# Patient Record
Sex: FEMALE | URBAN - METROPOLITAN AREA
[De-identification: names, ages, dates, MRNs, and addresses within clinical notes are randomized per-mention and may not be internally consistent; named-entity substitution may affect disease eponyms.]

---
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
## 2019-05-28 ENCOUNTER — OFFICE VISIT (OUTPATIENT)
Dept: URGENT CARE | Facility: CLINIC | Age: 19
End: 2019-05-28
Payer: COMMERCIAL

## 2019-05-28 VITALS
DIASTOLIC BLOOD PRESSURE: 81 MMHG | HEART RATE: 85 BPM | BODY MASS INDEX: 29.71 KG/M2 | WEIGHT: 174 LBS | HEIGHT: 64 IN | OXYGEN SATURATION: 97 % | TEMPERATURE: 100.8 F | RESPIRATION RATE: 20 BRPM | SYSTOLIC BLOOD PRESSURE: 113 MMHG

## 2019-05-28 DIAGNOSIS — J03.90 TONSILLITIS: Primary | ICD-10-CM

## 2019-05-28 LAB — S PYO AG THROAT QL: NEGATIVE

## 2019-05-28 PROCEDURE — 87147 CULTURE TYPE IMMUNOLOGIC: CPT | Performed by: PHYSICIAN ASSISTANT

## 2019-05-28 PROCEDURE — 87880 STREP A ASSAY W/OPTIC: CPT | Performed by: PHYSICIAN ASSISTANT

## 2019-05-28 PROCEDURE — 87070 CULTURE OTHR SPECIMN AEROBIC: CPT | Performed by: PHYSICIAN ASSISTANT

## 2019-05-28 PROCEDURE — G0382 LEV 3 HOSP TYPE B ED VISIT: HCPCS | Performed by: FAMILY MEDICINE

## 2019-05-28 RX ORDER — AMOXICILLIN 500 MG/1
500 TABLET, FILM COATED ORAL 3 TIMES DAILY
Qty: 30 TABLET | Refills: 0 | Status: SHIPPED | OUTPATIENT
Start: 2019-05-28 | End: 2019-05-28 | Stop reason: CLARIF

## 2019-05-28 RX ORDER — AMOXICILLIN 500 MG/1
500 TABLET, FILM COATED ORAL 3 TIMES DAILY
Qty: 30 TABLET | Refills: 0 | Status: SHIPPED | OUTPATIENT
Start: 2019-05-28 | End: 2019-06-07

## 2019-05-31 ENCOUNTER — TELEPHONE (OUTPATIENT)
Dept: URGENT CARE | Facility: CLINIC | Age: 19
End: 2019-05-31

## 2019-05-31 LAB — BACTERIA THROAT CULT: ABNORMAL

## 2019-09-10 ENCOUNTER — OFFICE VISIT (OUTPATIENT)
Dept: FAMILY MEDICINE CLINIC | Facility: CLINIC | Age: 19
End: 2019-09-10
Payer: COMMERCIAL

## 2019-09-10 VITALS
TEMPERATURE: 98.3 F | RESPIRATION RATE: 19 BRPM | DIASTOLIC BLOOD PRESSURE: 76 MMHG | HEART RATE: 88 BPM | OXYGEN SATURATION: 98 % | BODY MASS INDEX: 29.84 KG/M2 | HEIGHT: 65 IN | WEIGHT: 179.13 LBS | SYSTOLIC BLOOD PRESSURE: 120 MMHG

## 2019-09-10 DIAGNOSIS — Z13.1 SCREENING FOR DIABETES MELLITUS: ICD-10-CM

## 2019-09-10 DIAGNOSIS — N94.6 DYSMENORRHEA: ICD-10-CM

## 2019-09-10 DIAGNOSIS — Z11.3 SCREENING FOR STD (SEXUALLY TRANSMITTED DISEASE): ICD-10-CM

## 2019-09-10 DIAGNOSIS — Z13.29 SCREENING FOR THYROID DISORDER: ICD-10-CM

## 2019-09-10 DIAGNOSIS — N64.4 BREAST TENDERNESS: Primary | ICD-10-CM

## 2019-09-10 DIAGNOSIS — Z76.89 ENCOUNTER TO ESTABLISH CARE: ICD-10-CM

## 2019-09-10 DIAGNOSIS — Z13.0 SCREENING FOR DEFICIENCY ANEMIA: ICD-10-CM

## 2019-09-10 PROBLEM — I88.0 NONSPECIFIC MESENTERIC ADENITIS: Status: ACTIVE | Noted: 2019-09-10

## 2019-09-10 LAB — SL AMB POCT URINE HCG: NEGATIVE

## 2019-09-10 PROCEDURE — 87491 CHLMYD TRACH DNA AMP PROBE: CPT | Performed by: NURSE PRACTITIONER

## 2019-09-10 PROCEDURE — 81025 URINE PREGNANCY TEST: CPT | Performed by: NURSE PRACTITIONER

## 2019-09-10 PROCEDURE — 3008F BODY MASS INDEX DOCD: CPT | Performed by: NURSE PRACTITIONER

## 2019-09-10 PROCEDURE — 87591 N.GONORRHOEAE DNA AMP PROB: CPT | Performed by: NURSE PRACTITIONER

## 2019-09-10 PROCEDURE — 99203 OFFICE O/P NEW LOW 30 MIN: CPT | Performed by: NURSE PRACTITIONER

## 2019-09-10 RX ORDER — ACETAMINOPHEN AND CODEINE PHOSPHATE 120; 12 MG/5ML; MG/5ML
1 SOLUTION ORAL DAILY
Qty: 28 TABLET | Refills: 2 | Status: SHIPPED | OUTPATIENT
Start: 2019-09-10 | End: 2019-12-07 | Stop reason: SDUPTHER

## 2019-09-10 NOTE — PROGRESS NOTES
[facility]    ASSESSMENT AND PLAN     1  Breast tenderness  Presents today with complaint of generalized breast tenderness  Physical assessment as below  Discussed likely hormonal   Discussed contraception/hormonal regulation  Note history of migraines with aura  Rx today for progesterone only Micronor  In office HCG negative  Dose and use reviewed  Encouraged continued condom use  Educated on self breast exams  Due for woman's health/Pap January 2020  Return 6-8 weeks for follow-up  Sooner if needed     - norethindrone (MICRONOR) 0 35 MG tablet; Take 1 tablet (0 35 mg total) by mouth daily  Dispense: 28 tablet; Refill: 2    2  Dysmenorrhea  Menstrual cycle irregular since stopping Depo shot 8 months ago  Cycles are averaging every 2-6 weeks  Cycle completely skipped in June  HCG negative in office today  Micronor started for regulation  If remains symptomatic, will further assess w/ transvaginal ultrasound  Differential considered:  PCOS  - POCT urine HCG    3  Screening for deficiency anemia  Assess for anemia    - CBC and differential; Future    4  Screening for diabetes mellitus    - Comprehensive metabolic panel; Future    5  Screening for thyroid disorder    - TSH, 3rd generation with Free T4 reflex; Future    6  Screening for STD (sexually transmitted disease)  Admits to episodes of unprotected sex  Recommend STD testing  Patient agreeable to urine chlamydia/gonorrhea  Declines further panel at this time  Stressed importance of condom use despite oral contraception    - Chlamydia/GC amplified DNA by PCR    7  Encounter to establish care  Establishing primary care in this office      SUBJECTIVE       Patient ID: Suzy Marquez is a [address] y o  female  Chief Complaint   Patient presents with    New Patient     has been having B/L breast pain, mostly on R x 2 weeks   Denies lump or injury       HISTORY OF PRESENT ILLNESS    Patient presents today to establish primary care in this office  She has complaint of bilateral breast pain  States it started roughly 2-3 months ago  It is her entire breast   The pain/sensitivity comes and goes  She feels it most in the morning when waking  Denies feeling any lumps  Denies any nipple pain or discharge  Her menstrual cycles have been irregular since stopping the Depo shot in December/2019  They average every 4-8 weeks  She did not have a cycle at all the month of June  She had her most recent periods were August 14th, lasting roughly 7 days  She got a 2nd  On August 30 that lasted a few days as well  States her periods will usually last roughly 6 days  They are heavy flow in the 1st few days  She does experience bad cramping  The following portions of the patient's history were reviewed and updated as appropriate: allergies, current medications, past family history, past medical history, past social history, past surgical history and problem list     REVIEW OF SYSTEMS  Review of Systems   Constitutional: Negative  Genitourinary: Positive for menstrual problem and vaginal bleeding  Negative for difficulty urinating, dyspareunia, genital sores and pelvic pain  Breast tenderness, bilaterally       OBJECTIVE      VITAL SIGNS  /76 (BP Location: Left arm, Patient Position: Sitting, Cuff Size: Adult)   Pulse 88   Temp 98 3 °F (36 8 °C) (Tympanic)   Resp 19   Ht 5' 4 57" (1 64 m)   Wt 81 3 kg (179 lb 2 oz)   LMP 08/30/2019   SpO2 98%   Breastfeeding? No   BMI 30 21 kg/m²     CURRENT MEDICATIONS    Current Outpatient Medications:     norethindrone (MICRONOR) 0 35 MG tablet, Take 1 tablet (0 35 mg total) by mouth daily, Disp: 28 tablet, Rfl: 2      PHYSICAL EXAMINATION   Physical Exam   Constitutional: She is oriented to person, place, and time  Vital signs are normal  She appears well-developed and well-nourished  Cardiovascular: Normal rate and regular rhythm     Pulmonary/Chest: Effort normal and breath sounds normal  No respiratory distress  Right breast exhibits no inverted nipple, no mass, no nipple discharge and no tenderness  Left breast exhibits no inverted nipple, no mass, no nipple discharge and no tenderness  No breast swelling  Breasts are symmetrical    Bilateral nipple piercings   Lymphadenopathy:     She has no axillary adenopathy  Neurological: She is alert and oriented to person, place, and time  Skin: Skin is warm, dry and intact  Psychiatric: She has a normal mood and affect  Judgment and thought content normal    Nursing note and vitals reviewed

## 2019-09-11 LAB
C TRACH DNA SPEC QL NAA+PROBE: NEGATIVE
N GONORRHOEA DNA SPEC QL NAA+PROBE: NEGATIVE

## 2019-12-07 DIAGNOSIS — Z11.3 SCREENING FOR STD (SEXUALLY TRANSMITTED DISEASE): ICD-10-CM

## 2019-12-08 RX ORDER — NORETHINDRONE 0.35 MG/1
TABLET ORAL
Qty: 28 TABLET | Refills: 2 | Status: SHIPPED | OUTPATIENT
Start: 2019-12-08 | End: 2020-02-26 | Stop reason: SDUPTHER

## 2020-02-21 DIAGNOSIS — Z11.3 SCREENING FOR STD (SEXUALLY TRANSMITTED DISEASE): ICD-10-CM

## 2020-02-21 RX ORDER — ACETAMINOPHEN AND CODEINE PHOSPHATE 120; 12 MG/5ML; MG/5ML
1 SOLUTION ORAL DAILY
Qty: 28 TABLET | Refills: 2 | OUTPATIENT
Start: 2020-02-21

## 2020-02-21 NOTE — TELEPHONE ENCOUNTER
Please call patient  She is due for her gyn/Pap exam   Please assist her in scheduling same  Her birth control can be refilled at that time    She should have at least 1 more month left

## 2020-02-25 NOTE — TELEPHONE ENCOUNTER
Pt called and stated she went to pharmacy and they stated she no longer has any refills  Pt scheduling pap / gyn exam now

## 2020-02-26 DIAGNOSIS — Z11.3 SCREENING FOR STD (SEXUALLY TRANSMITTED DISEASE): ICD-10-CM

## 2020-02-26 DIAGNOSIS — N94.6 DYSMENORRHEA: Primary | ICD-10-CM

## 2020-02-26 RX ORDER — ACETAMINOPHEN AND CODEINE PHOSPHATE 120; 12 MG/5ML; MG/5ML
1 SOLUTION ORAL DAILY
Qty: 28 TABLET | Refills: 0 | Status: SHIPPED | OUTPATIENT
Start: 2020-02-26 | End: 2020-03-25 | Stop reason: SDUPTHER

## 2020-02-29 DIAGNOSIS — N94.6 DYSMENORRHEA: ICD-10-CM

## 2020-03-01 RX ORDER — NORETHINDRONE 0.35 MG/1
TABLET ORAL
Qty: 28 TABLET | Refills: 2 | OUTPATIENT
Start: 2020-03-01

## 2020-03-06 ENCOUNTER — ANNUAL EXAM (OUTPATIENT)
Dept: FAMILY MEDICINE CLINIC | Facility: CLINIC | Age: 20
End: 2020-03-06
Payer: COMMERCIAL

## 2020-03-06 VITALS
BODY MASS INDEX: 32.61 KG/M2 | WEIGHT: 191 LBS | OXYGEN SATURATION: 99 % | HEIGHT: 64 IN | HEART RATE: 67 BPM | DIASTOLIC BLOOD PRESSURE: 70 MMHG | SYSTOLIC BLOOD PRESSURE: 122 MMHG | TEMPERATURE: 97.4 F

## 2020-03-06 DIAGNOSIS — Z13.220 SCREENING FOR LIPID DISORDERS: ICD-10-CM

## 2020-03-06 DIAGNOSIS — Z13.29 SCREENING FOR THYROID DISORDER: ICD-10-CM

## 2020-03-06 DIAGNOSIS — N63.0 BREAST LUMP: ICD-10-CM

## 2020-03-06 DIAGNOSIS — Z12.4 CERVICAL CANCER SCREENING: ICD-10-CM

## 2020-03-06 DIAGNOSIS — Z13.1 SCREENING FOR DIABETES MELLITUS: ICD-10-CM

## 2020-03-06 DIAGNOSIS — Z01.419 ENCOUNTER FOR GYNECOLOGICAL EXAMINATION: Primary | ICD-10-CM

## 2020-03-06 PROCEDURE — 1036F TOBACCO NON-USER: CPT | Performed by: NURSE PRACTITIONER

## 2020-03-06 PROCEDURE — G0143 SCR C/V CYTO,THINLAYER,RESCR: HCPCS | Performed by: NURSE PRACTITIONER

## 2020-03-06 PROCEDURE — 87624 HPV HI-RISK TYP POOLED RSLT: CPT | Performed by: NURSE PRACTITIONER

## 2020-03-06 PROCEDURE — 99214 OFFICE O/P EST MOD 30 MIN: CPT | Performed by: NURSE PRACTITIONER

## 2020-03-10 LAB
HPV HR 12 DNA CVX QL NAA+PROBE: NEGATIVE
HPV16 DNA CVX QL NAA+PROBE: NEGATIVE
HPV18 DNA CVX QL NAA+PROBE: NEGATIVE

## 2020-03-11 LAB
LAB AP GYN PRIMARY INTERPRETATION: NORMAL
Lab: NORMAL

## 2020-03-16 NOTE — PROGRESS NOTES
[facility]    ASSESSMENT AND PLAN     1  Encounter for gynecological examination  Presents today for first women's health exam   Nulliparous  Sexually active  In heterosexual/monogamous relationship  Denies need for STD testing  Birth control:  Micronor and condoms  No side effects from [facility]  Periods regular at every 28 days  Denies excessive cramping and or clotting  Speculum exam today as below  Specimen sent for cervical cancer screen  Reviewed proper technique for self-breast exams  Breast exam today with tenderness bilateral breasts and small, mobile mass noted right breast   Patient states it has been there several months  Will assess with ultrasound  Call with results and further plan if indicated  - Liquid-based pap, screening  - HPV High Risk; Future  - HPV High Risk    2  Cervical cancer screening    - Liquid-based pap, screening  - HPV High Risk; Future  - HPV High Risk    3  Screening for thyroid disorder  Will assess thyroid function    - TSH, 3rd generation; Future    4  Screening for lipid disorders  Baseline lipid panel    - Lipid panel; Future    5  Screening for diabetes mellitus    - Comprehensive metabolic panel; Future    6  Breast lump    - US breast right limited (diagnostic); Future            SUBJECTIVE       Patient ID: Gladis Sanches is a 23 y o  female  Chief Complaint   Patient presents with    Gynecologic Exam     Pap smear       HISTORY OF PRESENT ILLNESS    Patient presents today for 1st women's health exam  Sexually active  In heterosexual/monogamous relationship  Denies need for STD testing  Birth control:  Micronor and condoms  No side effects from [facility]  Periods regular at every 28 days  Denies excessive cramping and or clotting          The following portions of the patient's history were reviewed and updated as appropriate: allergies, current medications, past family history, past medical history, past social history, past surgical history and problem list     REVIEW OF SYSTEMS  Review of Systems   Constitutional: Negative  Respiratory: Negative  Cardiovascular: Negative  Gastrointestinal: Negative  Endocrine: Negative  Genitourinary: Negative  Skin: Negative  Neurological: Negative  Psychiatric/Behavioral: Negative  OBJECTIVE      VITAL SIGNS  /70 (BP Location: Left arm, Patient Position: Sitting, Cuff Size: Adult)   Pulse 67   Temp (!) 97 4 °F (36 3 °C)   Ht 5' 4 17" (1 63 m)   Wt 86 6 kg (191 lb)   SpO2 99%   BMI 32 61 kg/m²     CURRENT MEDICATIONS    Current Outpatient Medications:     norethindrone (Yasmeen) 0 35 MG tablet, Take 1 tablet (0 35 mg total) by mouth daily, Disp: 28 tablet, Rfl: 0      PHYSICAL EXAMINATION   Physical Exam   Constitutional: She is oriented to person, place, and time  Vital signs are normal  She appears well-developed and well-nourished  Neck: No thyromegaly present  Cardiovascular: Normal rate, regular rhythm and normal heart sounds  Pulmonary/Chest: Effort normal and breath sounds normal  No respiratory distress  Right breast exhibits mass and tenderness  Right breast exhibits no inverted nipple and no skin change  Left breast exhibits no inverted nipple, no nipple discharge, no skin change and no tenderness  Bilateral nipples with piercing holes   Abdominal: Soft  Normal appearance and bowel sounds are normal  There is no tenderness  Genitourinary: Vagina normal  Rectal exam shows no external hemorrhoid, no fissure and no tenderness  Pelvic exam was performed with patient supine  No labial fusion  There is no rash, tenderness, lesion or injury on the right labia  There is no rash, tenderness, lesion or injury on the left labia  Cervix exhibits no motion tenderness and no discharge  Right adnexum displays no mass, no tenderness and no fullness  Left adnexum displays no mass, no tenderness and no fullness     Neurological: She is alert and oriented to person, place, and time    Psychiatric: She has a normal mood and affect  Thought content normal    Nursing note and vitals reviewed

## 2020-03-25 DIAGNOSIS — N94.6 DYSMENORRHEA: ICD-10-CM

## 2020-03-25 RX ORDER — ACETAMINOPHEN AND CODEINE PHOSPHATE 120; 12 MG/5ML; MG/5ML
1 SOLUTION ORAL DAILY
Qty: 28 TABLET | Refills: 5 | Status: SHIPPED | OUTPATIENT
Start: 2020-03-25 | End: 2020-04-22 | Stop reason: SDUPTHER

## 2020-03-25 NOTE — TELEPHONE ENCOUNTER
Pt called and needs refill on:    - norethindrone (yumi) 0 35 mg tablet  Takes 1 tablet by mouth daily  Qty: 28 tablet    Send to [facility] in [place]

## 2020-04-22 DIAGNOSIS — N94.6 DYSMENORRHEA: ICD-10-CM

## 2020-04-22 RX ORDER — ACETAMINOPHEN AND CODEINE PHOSPHATE 120; 12 MG/5ML; MG/5ML
1 SOLUTION ORAL DAILY
Qty: 28 TABLET | Refills: 5 | Status: SHIPPED | OUTPATIENT
Start: 2020-04-22 | End: 2020-10-05

## 2020-09-22 ENCOUNTER — OFFICE VISIT (OUTPATIENT)
Dept: FAMILY MEDICINE CLINIC | Facility: CLINIC | Age: 20
End: 2020-09-22
Payer: COMMERCIAL

## 2020-09-22 VITALS
WEIGHT: 202.6 LBS | BODY MASS INDEX: 33.76 KG/M2 | OXYGEN SATURATION: 98 % | HEIGHT: 65 IN | SYSTOLIC BLOOD PRESSURE: 114 MMHG | TEMPERATURE: 98 F | DIASTOLIC BLOOD PRESSURE: 66 MMHG | HEART RATE: 67 BPM

## 2020-09-22 DIAGNOSIS — N94.6 DYSMENORRHEA: Primary | ICD-10-CM

## 2020-09-22 DIAGNOSIS — N63.0 BREAST LUMP: ICD-10-CM

## 2020-09-22 DIAGNOSIS — L70.9 ACNE, UNSPECIFIED ACNE TYPE: ICD-10-CM

## 2020-09-22 PROCEDURE — 99214 OFFICE O/P EST MOD 30 MIN: CPT | Performed by: NURSE PRACTITIONER

## 2020-09-27 NOTE — PROGRESS NOTES
[facility]    ASSESSMENT AND PLAN     1  Dysmenorrhea  Reviewed symptoms at length today  Discussed that breakthrough bleeding can occur when on progesterone birth control  Note last Pap normal in March  Denies risk for STDs  No vaginal itch, lesion, discharge  Monogamous relationship  Denies possible pregnancy  Not on estrogen candidate secondary to migraines with aura  Reviewed IUD  Patient would prefer to stay on Micronor at this time  Will monitor symptoms and follow-up if she would like to change/altered treatment    2  Acne, unspecified acne type  Patient with intermittent acne breakouts on face  Requesting referral to Dermatology  Given today    - Ambulatory referral to Dermatology; Future    3  Breast lump  Small, mobile mass still noted roughly 10 o'clock right breast   Referred for ultrasound in March  Patient has yet to obtain  Patient noted it several months prior to that  Likely cyst, but do recommend follow-up with the ultrasound  Patient states she will schedule      Note labs were recommended at annual woman's exam in March for baseline  Patient has yet to obtain      SUBJECTIVE       Patient ID: Kevin Padron is a 23 y o  female  Chief Complaint   Patient presents with    Menstrual Problem     Patient states her periods have been irregular and occuring several times during the month x several months  Lasts usually 5 days       HISTORY OF PRESENT ILLNESS    Patient presents today to discuss irregular menses  Noted for the last few months  States she has breakthrough bleeding during the month  Does not appear to be a full peroid  Blood is dark brown throughout the month  Denies any vaginal pain or discharge  No itching or lesions  Monogamous sexual relationship  Denies STDs  Would also like to discuss acne  Notes it worsening over the last few months    Believes it may be secondary to mask        The following portions of the patient's history were reviewed and updated as appropriate: allergies, current medications, past family history, past medical history, past social history, past surgical history and problem list     REVIEW OF SYSTEMS  Review of Systems   Genitourinary: Positive for menstrual problem  Negative for difficulty urinating, dyspareunia, dysuria, genital sores, hematuria, pelvic pain, urgency, vaginal discharge and vaginal pain  Skin:        Facial acne  OBJECTIVE      VITAL SIGNS  /66 (BP Location: Left arm, Patient Position: Sitting, Cuff Size: Adult)   Pulse 67   Temp 98 °F (36 7 °C)   Ht 5' 4 75" (1 645 m)   Wt 91 9 kg (202 lb 9 6 oz)   LMP 09/16/2020 (Exact Date)   SpO2 98%   BMI 33 98 kg/m²     CURRENT MEDICATIONS    Current Outpatient Medications:     norethindrone (Yasmeen) 0 35 MG tablet, Take 1 tablet (0 35 mg total) by mouth daily, Disp: 28 tablet, Rfl: 5      PHYSICAL EXAMINATION   Physical Exam  Vitals signs and nursing note reviewed  Constitutional:       General: She is not in acute distress  Appearance: Normal appearance  She is well-developed  She is not ill-appearing  Pulmonary:      Effort: Pulmonary effort is normal  No respiratory distress  Chest:          Comments: Bilateral nipple piercing  Skin:     General: Skin is warm and dry  Comments: Facial pustular acne   Neurological:      Mental Status: She is alert and oriented to person, place, and time  Psychiatric:         Attention and Perception: Attention and perception normal          Mood and Affect: Mood and affect normal          Speech: Speech normal          Behavior: Behavior normal          Thought Content:  Thought content normal          Judgment: Judgment normal

## 2020-10-05 DIAGNOSIS — N94.6 DYSMENORRHEA: ICD-10-CM

## 2020-10-05 RX ORDER — NORETHINDRONE 0.35 MG/1
TABLET ORAL
Qty: 84 TABLET | Refills: 0 | Status: SHIPPED | OUTPATIENT
Start: 2020-10-05 | End: 2020-12-28 | Stop reason: SDUPTHER

## 2020-10-06 DIAGNOSIS — N94.6 DYSMENORRHEA: ICD-10-CM

## 2020-10-06 RX ORDER — ACETAMINOPHEN AND CODEINE PHOSPHATE 120; 12 MG/5ML; MG/5ML
1 SOLUTION ORAL DAILY
Qty: 84 TABLET | Refills: 0 | OUTPATIENT
Start: 2020-10-06

## 2020-11-23 ENCOUNTER — TELEMEDICINE (OUTPATIENT)
Dept: FAMILY MEDICINE CLINIC | Facility: CLINIC | Age: 20
End: 2020-11-23
Payer: COMMERCIAL

## 2020-11-23 DIAGNOSIS — Z20.822 EXPOSURE TO COVID-19 VIRUS: Primary | ICD-10-CM

## 2020-11-23 PROCEDURE — 99213 OFFICE O/P EST LOW 20 MIN: CPT | Performed by: FAMILY MEDICINE

## 2020-12-28 DIAGNOSIS — N94.6 DYSMENORRHEA: ICD-10-CM

## 2020-12-28 RX ORDER — ACETAMINOPHEN AND CODEINE PHOSPHATE 120; 12 MG/5ML; MG/5ML
1 SOLUTION ORAL DAILY
Qty: 84 TABLET | Refills: 0 | Status: SHIPPED | OUTPATIENT
Start: 2020-12-28 | End: 2021-03-21 | Stop reason: SDUPTHER

## 2021-02-22 ENCOUNTER — OFFICE VISIT (OUTPATIENT)
Dept: FAMILY MEDICINE CLINIC | Facility: CLINIC | Age: 21
End: 2021-02-22
Payer: COMMERCIAL

## 2021-02-22 VITALS
BODY MASS INDEX: 33.52 KG/M2 | OXYGEN SATURATION: 98 % | SYSTOLIC BLOOD PRESSURE: 110 MMHG | DIASTOLIC BLOOD PRESSURE: 70 MMHG | TEMPERATURE: 98.3 F | HEIGHT: 65 IN | HEART RATE: 77 BPM | WEIGHT: 201.2 LBS
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
## 2021-02-22 DIAGNOSIS — R55 SYNCOPE, UNSPECIFIED SYNCOPE TYPE: ICD-10-CM

## 2021-02-22 DIAGNOSIS — S00.83XA CONTUSION OF JAW, INITIAL ENCOUNTER: ICD-10-CM

## 2021-02-22 PROCEDURE — 1036F TOBACCO NON-USER: CPT | Performed by: NURSE PRACTITIONER

## 2021-02-22 PROCEDURE — 3008F BODY MASS INDEX DOCD: CPT | Performed by: NURSE PRACTITIONER

## 2021-02-22 PROCEDURE — 99214 OFFICE O/P EST MOD 30 MIN: CPT | Performed by: NURSE PRACTITIONER

## 2021-02-22 RX ORDER — IBUPROFEN 600 MG/1
600 TABLET ORAL EVERY 6 HOURS PRN
Qty: 30 TABLET | Refills: 1 | Status: SHIPPED | OUTPATIENT
Start: 2021-02-22

## 2021-02-22 RX ORDER — ONDANSETRON 4 MG/1
4 TABLET, ORALLY DISINTEGRATING ORAL EVERY 8 HOURS PRN
COMMUNITY
Start: 2021-02-15 | End: 2022-03-30

## 2021-02-22 NOTE — PROGRESS NOTES
[facility]    ASSESSMENT AND PLAN     1  Fall, initial encounter   reviewed details of recent fall  Assessment today unremarkable with exception slight, residual jaw pain (as below ) and large ecchymotic area right glute  No signs of head injury or concussion  Does not appear treatment or further workup warranted at this time  Patient has been advised to return for further eval with any further problems and/or concerns  ER precautions    2  Syncope, unspecified syncope type    Unclear as to the etiology of her "blackout" episode in the bathroom after fall down stairs  Question pain induced? Assessment unremarkable today as above  In office EKG completed: Sinus erma with sinus arrhythmia  Otherwise negative  Patient to monitor symptoms and return for further eval should she have any further episodes    - POCT ECG    3  Contusion of jaw, initial encounter  Symptoms slowly improving  Recommend intermittent heat, gentle stretches  Avoid hard, chewy foods for next few days  Rx for motrin 600  Return for further workup (possible film) if persist despite conservative treatment  - ibuprofen (MOTRIN) 600 mg tablet; Take 1 tablet (600 mg total) by mouth every 6 (six) hours as needed for mild pain or moderate pain  Dispense: 30 tablet; Refill: 1            SUBJECTIVE       Patient ID: Leticia De La Rosa is a 24 y o  female  Chief Complaint   Patient presents with   [place] Fall     patient states she fell down steps Saturday night, landed on buttocks, she felt sweaty, lightheaded and thought she had to go to the bathroom, then blacked out in bathroom, fell on floor, states she fell on left side of jaw  HISTORY OF PRESENT ILLNESS     Patient presents today s/p fall on Saturday night  States she was walking down set of inside  steps and her foot slipped  Slid down a few steps landing on her right buttock  Did not hit her head  No loss of consciousness   Noted fairly significant pain in right buttock, so she went to lie down  Pain made her little weak and nauseous  Got up shortly after to go to the bathroom, and felt sweaty and lightheaded when walking to the bathroom  ASsumeed secondary to pain in buttock  States she went to sit on the toilet, and believe she may  "Blacked out"  Woke up on the floor in the bathroom  Unsure if she hit her head at that time, but noted some pain on her left jaw  Went to bed and slept well that night  Sunday with mild , generalized headache  Jaw stiff and painful to open  No visual disturbances  No numbness or weakness  Right buttock with large bruising  She had concern for concussion  Symptoms improved today        The following portions of the patient's history were reviewed and updated as appropriate: allergies, current medications, past family history, past medical history, past social history, past surgical history and problem list     REVIEW OF SYSTEMS  Review of Systems   Constitutional: Negative  HENT: Negative  Left jaw pain - improving   Eyes: Negative for photophobia and visual disturbance  Respiratory: Negative  Cardiovascular: Negative  Gastrointestinal: Negative  Genitourinary: Negative  Musculoskeletal: Negative  Skin:        Right buttock bruising   Neurological: Negative for dizziness, weakness, numbness and headaches  Psychiatric/Behavioral: Negative          OBJECTIVE      VITAL SIGNS  /70 (BP Location: Left arm, Patient Position: Sitting, Cuff Size: Adult)   Pulse 77   Temp 98 3 °F (36 8 °C)   Ht 5' 5" (1 651 m)   Wt 91 3 kg (201 lb 3 2 oz)   LMP 02/10/2021 (Exact Date)   SpO2 98%   BMI 33 48 kg/m²     CURRENT MEDICATIONS    Current Outpatient Medications:     norethindrone (Yasmeen) 0 35 MG tablet, Take 1 tablet (0 35 mg total) by mouth daily, Disp: 84 tablet, Rfl: 0    ibuprofen (MOTRIN) 600 mg tablet, Take 1 tablet (600 mg total) by mouth every 6 (six) hours as needed for mild pain or moderate pain, Disp: 30 tablet, Rfl: 1    ondansetron (ZOFRAN-ODT) 4 mg disintegrating tablet, Take 4 mg by mouth every 8 (eight) hours as needed, Disp: , Rfl:       PHYSICAL EXAMINATION   Physical Exam  Vitals signs and nursing note reviewed  Constitutional:       General: She is not in acute distress  Appearance: Normal appearance  She is well-developed and well-groomed  She is not ill-appearing  HENT:      Head: Normocephalic and atraumatic  Right Ear: Tympanic membrane, ear canal and external ear normal       Left Ear: Tympanic membrane, ear canal and external ear normal       Nose: Nose normal       Mouth/Throat:      Mouth: Mucous membranes are moist       Pharynx: Oropharynx is clear  Comments: Mild stated pain upon jaw opening, but ROM intact  No clicking or popping  Mild  pain with palpation  Eyes:      Extraocular Movements: Extraocular movements intact  Conjunctiva/sclera: Conjunctivae normal       Pupils: Pupils are equal, round, and reactive to light  Neck:      Musculoskeletal: Full passive range of motion without pain  Cardiovascular:      Rate and Rhythm: Normal rate and regular rhythm  Heart sounds: Normal heart sounds  Pulmonary:      Effort: Pulmonary effort is normal  No respiratory distress  Breath sounds: Normal breath sounds and air entry  Lymphadenopathy:      Cervical: No cervical adenopathy  Skin:     General: Skin is warm and dry  Neurological:      Mental Status: She is alert and oriented to person, place, and time  Cranial Nerves: Cranial nerves are intact  Sensory: Sensation is intact  Motor: Motor function is intact  Coordination: Coordination is intact  Gait: Gait is intact  Deep Tendon Reflexes:      Reflex Scores:       Bicep reflexes are 2+ on the right side and 2+ on the left side  Patellar reflexes are 2+ on the right side and 2+ on the left side    Psychiatric:         Attention and Perception: Attention normal  Mood and Affect: Mood normal          Speech: Speech normal          Behavior: Behavior normal          Thought Content:  Thought content normal          Cognition and Memory: Cognition normal          Judgment: Judgment normal

## 2021-02-23 PROCEDURE — 93000 ELECTROCARDIOGRAM COMPLETE: CPT | Performed by: NURSE PRACTITIONER

## 2021-03-21 DIAGNOSIS — N94.6 DYSMENORRHEA: ICD-10-CM

## 2021-03-23 RX ORDER — ACETAMINOPHEN AND CODEINE PHOSPHATE 120; 12 MG/5ML; MG/5ML
1 SOLUTION ORAL DAILY
Qty: 84 TABLET | Refills: 0 | Status: SHIPPED | OUTPATIENT
Start: 2021-03-23 | End: 2021-06-17

## 2021-06-15 DIAGNOSIS — N94.6 DYSMENORRHEA: ICD-10-CM

## 2021-06-17 DIAGNOSIS — N94.6 DYSMENORRHEA: ICD-10-CM

## 2021-06-17 RX ORDER — ACETAMINOPHEN AND CODEINE PHOSPHATE 120; 12 MG/5ML; MG/5ML
1 SOLUTION ORAL DAILY
Qty: 84 TABLET | Refills: 0 | OUTPATIENT
Start: 2021-06-17

## 2021-06-17 RX ORDER — NORETHINDRONE 0.35 MG/1
TABLET ORAL
Qty: 84 TABLET | Refills: 0 | Status: SHIPPED | OUTPATIENT
Start: 2021-06-17 | End: 2021-09-04 | Stop reason: SDUPTHER

## 2021-09-04 DIAGNOSIS — N94.6 DYSMENORRHEA: ICD-10-CM

## 2021-09-04 RX ORDER — ACETAMINOPHEN AND CODEINE PHOSPHATE 120; 12 MG/5ML; MG/5ML
1 SOLUTION ORAL DAILY
Qty: 84 TABLET | Refills: 1 | Status: SHIPPED | OUTPATIENT
Start: 2021-09-04 | End: 2022-02-15

## 2022-02-15 DIAGNOSIS — N94.6 DYSMENORRHEA: ICD-10-CM

## 2022-02-15 RX ORDER — ACETAMINOPHEN AND CODEINE PHOSPHATE 120; 12 MG/5ML; MG/5ML
SOLUTION ORAL
Qty: 84 TABLET | Refills: 1 | Status: SHIPPED | OUTPATIENT
Start: 2022-02-15 | End: 2022-03-11 | Stop reason: SDUPTHER

## 2022-02-25 ENCOUNTER — OFFICE VISIT (OUTPATIENT)
Dept: FAMILY MEDICINE CLINIC | Facility: CLINIC | Age: 22
End: 2022-02-25
Payer: MEDICARE

## 2022-02-25 VITALS
SYSTOLIC BLOOD PRESSURE: 116 MMHG | TEMPERATURE: 98.8 F | HEART RATE: 65 BPM | HEIGHT: 65 IN | BODY MASS INDEX: 31.96 KG/M2 | DIASTOLIC BLOOD PRESSURE: 70 MMHG | WEIGHT: 191.8 LBS | OXYGEN SATURATION: 93 %

## 2022-02-25 DIAGNOSIS — Z13.29 SCREENING FOR THYROID DISORDER: ICD-10-CM

## 2022-02-25 DIAGNOSIS — Z13.1 SCREENING FOR DIABETES MELLITUS: ICD-10-CM

## 2022-02-25 DIAGNOSIS — Z11.3 SCREENING FOR STD (SEXUALLY TRANSMITTED DISEASE): ICD-10-CM

## 2022-02-25 DIAGNOSIS — Z23 NEED FOR TDAP VACCINATION: ICD-10-CM

## 2022-02-25 DIAGNOSIS — Z11.59 NEED FOR HEPATITIS C SCREENING TEST: ICD-10-CM

## 2022-02-25 DIAGNOSIS — Z11.4 SCREENING FOR HIV (HUMAN IMMUNODEFICIENCY VIRUS): ICD-10-CM

## 2022-02-25 DIAGNOSIS — Z13.0 SCREENING FOR DEFICIENCY ANEMIA: ICD-10-CM

## 2022-02-25 DIAGNOSIS — Z13.0 SCREENING FOR IRON DEFICIENCY ANEMIA: ICD-10-CM

## 2022-02-25 DIAGNOSIS — N94.6 DYSMENORRHEA: Primary | ICD-10-CM

## 2022-02-25 DIAGNOSIS — Z13.21 ENCOUNTER FOR VITAMIN DEFICIENCY SCREENING: ICD-10-CM

## 2022-02-25 DIAGNOSIS — Z13.220 SCREENING FOR LIPID DISORDERS: ICD-10-CM

## 2022-02-25 DIAGNOSIS — N92.3 SPOTTING BETWEEN MENSES: ICD-10-CM

## 2022-02-25 PROCEDURE — 90471 IMMUNIZATION ADMIN: CPT | Performed by: NURSE PRACTITIONER

## 2022-02-25 PROCEDURE — 99214 OFFICE O/P EST MOD 30 MIN: CPT | Performed by: NURSE PRACTITIONER

## 2022-02-25 PROCEDURE — 90715 TDAP VACCINE 7 YRS/> IM: CPT | Performed by: NURSE PRACTITIONER

## 2022-02-25 NOTE — PROGRESS NOTES
[facility]    ASSESSMENT AND PLAN     1  Dysmenorrhea  Menses remains irregular  Frequent spotting and painful cramping some months  Note she has not had a migraine though since starting the progesterone only birth control  We discussed other birth control options, and she is interested in exploring an IUD  She is also due for women's health in March  Will refer today to establish with gyn for same, and to discuss if she is a candidate for the IUD  - Ambulatory Referral to Gynecology; Future    2  Spotting between menses    - Ambulatory Referral to Gynecology; Future    3  Screening for STD (sexually transmitted disease)  Patient not currently high risk, but has not had routine screening quite some time  Will add to lab work as below  - Human Immunodeficiency Virus 1/2 Antigen / Antibody ( Fourth Generation) with Reflex Testing; Future  - Chlamydia/GC amplified DNA by PCR; Future  - RPR; Future  - HSV TYPE 1,2 DNA PCR; Future  - Hepatitis C Ab W/Refl To HCV RNA, Qn, PCR; Future  - Human Immunodeficiency Virus 1/2 Antigen / Antibody ( Fourth Generation) with Reflex Testing  - Chlamydia/GC amplified DNA by PCR  - RPR  - HSV TYPE 1,2 DNA PCR  - Hepatitis C Ab W/Refl To HCV RNA, Qn, PCR    4  Screening for HIV (human immunodeficiency virus)  Age-appropriate recommended screening    - Human Immunodeficiency Virus 1/2 Antigen / Antibody ( Fourth Generation) with Reflex Testing; Future  - Human Immunodeficiency Virus 1/2 Antigen / Antibody ( Fourth Generation) with Reflex Testing    5  Need for hepatitis C screening test  Age-appropriate recommended screenings    - Hepatitis C Ab W/Refl To HCV RNA, Qn, PCR; Future  - Hepatitis C Ab W/Refl To HCV RNA, Qn, PCR    6  Screening for iron deficiency anemia  Due for annual physical   Fasting lab work orders placed as below, to complete prior  Schedule wellness exam in the next 2-3 weeks    Review results at that time    - CBC and differential; Future  - Iron, TIBC and Ferritin Panel; Future  - CBC and differential  - Iron, TIBC and Ferritin Panel    7  Encounter for vitamin deficiency screening  Will screen today, but did advised patient to verify insurance coverage prior    - Vitamin D 25 hydroxy; Future  - Vitamin D 25 hydroxy    8  Screening for deficiency anemia    - CBC and differential; Future  - CBC and differential    9  Screening for thyroid disorder    - TSH, 3rd generation with Free T4 reflex; Future  - TSH, 3rd generation with Free T4 reflex    10  Screening for diabetes mellitus    - Comprehensive metabolic panel; Future  - Comprehensive metabolic panel    11  Screening for lipid disorders    - Lipid panel; Future  - Lipid panel    12  Need for Tdap vaccination  Overdue  Will administered today    - TDAP VACCINE GREATER THAN OR EQUAL TO 6YO IM            SUBJECTIVE       Patient ID: Heriberto Valente is a 20 y o  female  Chief Complaint   Patient presents with    Menstrual Problem     Spotting in between cycles, excessive cramping       HISTORY OF PRESENT ILLNESS    Presents today to discuss irregular menses  She still gets spotting in between cycles  Sometimes last for several days  Notes increase in cramping some months  Sharp pains that come and go  She has not had a migraine since starting the progesterone though  The following portions of the patient's history were reviewed and updated as appropriate: allergies, current medications, past family history, past medical history, past social history, past surgical history and problem list     REVIEW OF SYSTEMS  Review of Systems   Constitutional: Negative  Respiratory: Negative  Cardiovascular: Negative  Gastrointestinal: Negative  Genitourinary: Positive for menstrual problem  Negative for dyspareunia and genital sores  Cyst on breast   Painful/tender breasts   Neurological: Negative  Psychiatric/Behavioral: Negative          OBJECTIVE      VITAL SIGNS  /70 (BP Location: Right arm, Patient Position: Sitting, Cuff Size: Adult)   Pulse 65   Temp 98 8 °F (37 1 °C)   Ht 5' 5" (1 651 m)   Wt 87 kg (191 lb 12 8 oz)   LMP 01/23/2022 (Exact Date)   SpO2 93%   BMI 31 92 kg/m²     CURRENT MEDICATIONS    Current Outpatient Medications:     ibuprofen (MOTRIN) 600 mg tablet, Take 1 tablet (600 mg total) by mouth every 6 (six) hours as needed for mild pain or moderate pain, Disp: 30 tablet, Rfl: 1    norethindrone (MICRONOR) 0 35 MG tablet, TAKE 1 TABLET BY MOUTH EVERY DAY, Disp: 84 tablet, Rfl: 1    ondansetron (ZOFRAN-ODT) 4 mg disintegrating tablet, Take 4 mg by mouth every 8 (eight) hours as needed, Disp: , Rfl:       PHYSICAL EXAMINATION   Physical Exam  Vitals and nursing note reviewed  Constitutional:       General: She is not in acute distress  Appearance: Normal appearance  She is well-developed and well-groomed  She is not ill-appearing  Pulmonary:      Effort: Pulmonary effort is normal  No respiratory distress  Chest:       Skin:     General: Skin is warm and dry  Neurological:      General: No focal deficit present  Mental Status: She is alert and oriented to person, place, and time  Psychiatric:         Attention and Perception: Attention normal          Mood and Affect: Mood normal          Speech: Speech normal          Behavior: Behavior normal          Thought Content:  Thought content normal          Judgment: Judgment normal

## 2022-03-11 ENCOUNTER — OFFICE VISIT (OUTPATIENT)
Dept: OBGYN CLINIC | Facility: CLINIC | Age: 22
End: 2022-03-11
Payer: COMMERCIAL

## 2022-03-11 VITALS — WEIGHT: 191 LBS | BODY MASS INDEX: 31.78 KG/M2 | SYSTOLIC BLOOD PRESSURE: 130 MMHG | DIASTOLIC BLOOD PRESSURE: 80 MMHG

## 2022-03-11 DIAGNOSIS — N94.6 DYSMENORRHEA: ICD-10-CM

## 2022-03-11 DIAGNOSIS — N94.6 MENSTRUAL CRAMPS: ICD-10-CM

## 2022-03-11 DIAGNOSIS — N92.3 SPOTTING BETWEEN MENSES: ICD-10-CM

## 2022-03-11 DIAGNOSIS — Z01.419 ENCOUNTER FOR GYNECOLOGICAL EXAMINATION WITH PAPANICOLAOU SMEAR OF CERVIX: Primary | ICD-10-CM

## 2022-03-11 PROCEDURE — G0124 SCREEN C/V THIN LAYER BY MD: HCPCS | Performed by: PATHOLOGY

## 2022-03-11 PROCEDURE — 99385 PREV VISIT NEW AGE 18-39: CPT | Performed by: OBSTETRICS & GYNECOLOGY

## 2022-03-11 PROCEDURE — G0145 SCR C/V CYTO,THINLAYER,RESCR: HCPCS | Performed by: PATHOLOGY

## 2022-03-11 PROCEDURE — 1036F TOBACCO NON-USER: CPT | Performed by: OBSTETRICS & GYNECOLOGY

## 2022-03-11 RX ORDER — ACETAMINOPHEN AND CODEINE PHOSPHATE 120; 12 MG/5ML; MG/5ML
1 SOLUTION ORAL DAILY
Qty: 84 TABLET | Refills: 3 | Status: SHIPPED | OUTPATIENT
Start: 2022-03-11

## 2022-03-11 NOTE — PROGRESS NOTES
ASSESSMENT & PLAN: Aleksandr Coffman is a 20 y o  No obstetric history on file  with normal gynecologic exam     1   Routine well woman exam done today  2  Pap:  The patient's last pap was 2020  It was normal   - no transformation zone   Pap was done today  Current ASCCP Guidelines reviewed  3   STD testing  was not done   4  Gardasil recommendations reviewed   5  The following were reviewed in today's visit: breast self exam, use and side effects of OCPs, family planning choices, exercise and healthy diet  6  Menstrual cramps that feels have gotten worse - US ordered  7  Reviewed currently OCP - we discussed alternatives which could provide for less breakthrough bleeding / all questions answered, some interest in LARC / all LARC available reviewed / information provided for precert in event desired     CC:  Annual Gynecologic Examination    HPI: Aleksandr Coffman is a 20 y o  No obstetric history on file  who presents for annual gynecologic examination  She has the following concerns:  bleeidng 1 weeks to 1 5 weeks after cycle , states months that she does not have this mid cycle bleeding she has noticed that her cycles are a lot more crampy   Interested In exploring birth control options     Health Maintenance:    She wears her seatbelt routinely  She does perform regular monthly self breast exams  She feels safe at home  Past Medical History:   Diagnosis Date    Migraine with aura     No pertinent past medical history     Strep throat        Past Surgical History:   Procedure Laterality Date    NO PAST SURGERIES         OB/Gyn History:      The current method of family planning is oral progesterone-only contraceptive  OB History    No obstetric history on file           Family History   Problem Relation Age of Onset    No Known Problems Mother     No Known Problems Father     No Known Problems Sister     Breast cancer Maternal Grandmother     Arthritis Family        Social History:  Social History     Socioeconomic History    Marital status: Single     Spouse name: Not on file    Number of children: Not on file    Years of education: Not on file    Highest education level: Not on file   Occupational History    Not on file   Tobacco Use    Smoking status: Former Smoker    Smokeless tobacco: Never Used    Tobacco comment: patient no longer vapes   Substance and Sexual Activity    Alcohol use: No    Drug use: Yes     Types: Marijuana    Sexual activity: Yes     Birth control/protection: None   Other Topics Concern    Not on file   Social History Narrative    Not on file     Social Determinants of Health     Financial Resource Strain: Not on file   Food Insecurity: Not on file   Transportation Needs: Not on file   Physical Activity: Not on file   Stress: Not on file   Social Connections: Not on file   Intimate Partner Violence: Not on file   Housing Stability: Not on file         No Known Allergies      Current Outpatient Medications:     ibuprofen (MOTRIN) 600 mg tablet, Take 1 tablet (600 mg total) by mouth every 6 (six) hours as needed for mild pain or moderate pain, Disp: 30 tablet, Rfl: 1    norethindrone (MICRONOR) 0 35 MG tablet, Take 1 tablet (0 35 mg total) by mouth daily, Disp: 84 tablet, Rfl: 3    ondansetron (ZOFRAN-ODT) 4 mg disintegrating tablet, Take 4 mg by mouth every 8 (eight) hours as needed, Disp: , Rfl:     Review of Systems:  Constitutional :no fever, feels well, no tiredness, no recent weight gain or loss  ENT: no ear ache, no loss of hearing, no nosebleeds or nasal discharge, no sore throat or hoarseness  Cardiovascular: no complaints of slow or fast heart beat, no chest pain, no palpitations, no leg claudication or lower extremity edema    Respiratory: no complaints of shortness of shortness of breath, no BALDERRAMA  Breasts:no complaints of breast pain, breast lump, or nipple discharge  Gastrointestinal: no complaints of abdominal pain, constipation, nausea, vomiting, or diarrhea or bloody stools  Genitourinary :as noted in HPI  Musculoskeletal: no complaints of arthralgia, no myalgia, no joint swelling or stiffness, no limb pain or swelling  Integumentary: no complaints of skin rash or lesion, itching or dry skin  Neurological: no complaints of headache, no confusion, no numbness or tingling, no dizziness or fainting    Objective      /80   Wt 86 6 kg (191 lb)   LMP 03/04/2022   BMI 31 78 kg/m²     General:   appears stated age, cooperative, alert normal mood and affect   Lungs: Unlabored breathing    Breasts: normal appearance, no masses or tenderness/ bilateral piercing     Abdomen: soft, non-tender, without masses or organomegaly   Vulva: normal   Vagina: normal vagina, no discharge, exudate, lesion, or erythema   Urethra: normal   Cervix: Normal, no discharge  Nontender     Uterus: normal size, contour, position, consistency, mobility, non-tender   Adnexa: no mass, fullness, tenderness   Psychiatric orientation to person, place, and time: normal  mood and affect: normal

## 2022-03-17 LAB
LAB AP GYN PRIMARY INTERPRETATION: NORMAL
Lab: NORMAL
PATH INTERP SPEC-IMP: NORMAL

## 2022-03-30 ENCOUNTER — HOSPITAL ENCOUNTER (OUTPATIENT)
Dept: CT IMAGING | Facility: HOSPITAL | Age: 22
Discharge: HOME | End: 2022-03-30
Payer: COMMERCIAL

## 2022-03-30 ENCOUNTER — APPOINTMENT (OUTPATIENT)
Dept: LAB | Facility: CLINIC | Age: 22
End: 2022-03-30
Payer: COMMERCIAL

## 2022-03-30 ENCOUNTER — OFFICE VISIT (OUTPATIENT)
Dept: FAMILY MEDICINE CLINIC | Facility: CLINIC | Age: 22
End: 2022-03-30
Payer: COMMERCIAL

## 2022-03-30 VITALS
WEIGHT: 189.8 LBS | HEIGHT: 65 IN | TEMPERATURE: 98.5 F | RESPIRATION RATE: 18 BRPM | DIASTOLIC BLOOD PRESSURE: 62 MMHG | SYSTOLIC BLOOD PRESSURE: 122 MMHG | OXYGEN SATURATION: 99 % | HEART RATE: 72 BPM | BODY MASS INDEX: 31.62 KG/M2

## 2022-03-30 DIAGNOSIS — R63.0 DECREASED APPETITE: ICD-10-CM

## 2022-03-30 DIAGNOSIS — R10.31 RIGHT LOWER QUADRANT PAIN: Primary | ICD-10-CM

## 2022-03-30 DIAGNOSIS — R10.31 RIGHT LOWER QUADRANT PAIN: ICD-10-CM

## 2022-03-30 PROCEDURE — G1004 CDSM NDSC: HCPCS

## 2022-03-30 PROCEDURE — 74177 CT ABD & PELVIS W/CONTRAST: CPT

## 2022-03-30 PROCEDURE — 99213 OFFICE O/P EST LOW 20 MIN: CPT | Performed by: FAMILY MEDICINE

## 2022-03-30 PROCEDURE — 3008F BODY MASS INDEX DOCD: CPT | Performed by: OBSTETRICS & GYNECOLOGY

## 2022-03-30 RX ADMIN — IOHEXOL 50 ML: 240 INJECTION, SOLUTION INTRATHECAL; INTRAVASCULAR; INTRAVENOUS; ORAL at 13:00

## 2022-03-30 RX ADMIN — IOHEXOL 100 ML: 350 INJECTION, SOLUTION INTRAVENOUS at 14:57

## 2022-03-30 NOTE — PROGRESS NOTES
Assessment/Plan:    Patient is 50-year-old female with right lower quadrant pain  The pain did worsen yesterday and she has decreased appetite  She denies fever chills  Her last menstrual period was the 4th of this month  She is on birth control and denies missing any pills  Because of the significant tenderness on palpation, I have ordered a CT scan to rule out appendicitis  I did discuss having the CT scan with patient  Diagnoses and all orders for this visit:    Right lower quadrant pain  -     CT abdomen pelvis w contrast; Future    Decreased appetite  -     CT abdomen pelvis w contrast; Future          Subjective:   Chief Complaint   Patient presents with    Abdominal Pain     RLQ pain        Patient ID: Nelly Childress is a 20 y o  female  Pain in right lower quadrant for about two weeks  Today 1/10, yesterday was 4-5/10  Each step she took yesterday, she felt a sharp pain  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[employer]  Most of time she does not lift heavy  Little pain when going to bathroom  BM's have been normal  Yesterday, was not hungry  LMP 3/4  Has not missed any OC's  Abdominal Pain  Pertinent negatives include no constipation, diarrhea, fever or nausea  The following portions of the patient's history were reviewed and updated as appropriate: allergies, current medications, past family history, past medical history, past social history, past surgical history and problem list     Review of Systems   Constitutional: Positive for appetite change (decreased appetite)  Negative for chills and fever  Gastrointestinal: Positive for abdominal pain  Negative for constipation, diarrhea and nausea           Objective:      /62 (BP Location: Left arm, Patient Position: Sitting)   Pulse 72   Temp 98 5 °F (36 9 °C) (Tympanic)   Resp 18   Ht 5' 5" (1 651 m)   Wt 86 1 kg (189 lb 12 8 oz)   LMP 03/04/2022   SpO2 99%   BMI 31 58 kg/m²          Physical Exam  Vitals and nursing note reviewed  Constitutional:       General: She is not in acute distress  HENT:      Head: Normocephalic  Neck:      Thyroid: No thyromegaly  Cardiovascular:      Rate and Rhythm: Normal rate and regular rhythm  Heart sounds: Normal heart sounds  Pulmonary:      Effort: Pulmonary effort is normal       Breath sounds: Normal breath sounds  Abdominal:      General: Bowel sounds are normal       Palpations: Abdomen is soft  Tenderness: There is abdominal tenderness (very tender on palpation) in the right lower quadrant  Lymphadenopathy:      Cervical: No cervical adenopathy  Skin:     General: Skin is warm and dry  Neurological:      Mental Status: She is alert and oriented to person, place, and time

## 2022-03-31 ENCOUNTER — TELEPHONE (OUTPATIENT)
Dept: OBGYN CLINIC | Facility: MEDICAL CENTER | Age: 22
End: 2022-03-31

## 2022-03-31 NOTE — TELEPHONE ENCOUNTER
I was able to get a hold of the patient and schedule her for an appointment for next week as Dr Ashley Willard requested  The patient then stated that she does have an u/s set for 4/6 and wanted to make sure that she still needed the appt    I then sent another message to Dr Ashley Willard to make sure that was still ok

## 2022-04-08 ENCOUNTER — OFFICE VISIT (OUTPATIENT)
Dept: OBGYN CLINIC | Facility: CLINIC | Age: 22
End: 2022-04-08
Payer: COMMERCIAL

## 2022-04-08 VITALS
SYSTOLIC BLOOD PRESSURE: 110 MMHG | WEIGHT: 192 LBS | HEIGHT: 65 IN | BODY MASS INDEX: 31.99 KG/M2 | DIASTOLIC BLOOD PRESSURE: 68 MMHG

## 2022-04-08 DIAGNOSIS — N83.201 RIGHT OVARIAN CYST: Primary | ICD-10-CM

## 2022-04-08 PROCEDURE — 1036F TOBACCO NON-USER: CPT | Performed by: OBSTETRICS & GYNECOLOGY

## 2022-04-08 PROCEDURE — 99213 OFFICE O/P EST LOW 20 MIN: CPT | Performed by: OBSTETRICS & GYNECOLOGY

## 2022-04-08 PROCEDURE — 3008F BODY MASS INDEX DOCD: CPT | Performed by: OBSTETRICS & GYNECOLOGY

## 2022-04-08 NOTE — PROGRESS NOTES
Mateusz Owens was seen today for follow-up  Diagnoses and all orders for this visit:    Right ovarian cyst  -     US pelvis complete w transvaginal; Future         Plan   20year-old with finding of right hemorrhagic cyst noted on CT scan sent for acute onset right lower quadrant pain  Pain currently resolved  We discussed the natural course of hemorrhagic ovarian cyst which is spontaneous resolution without need for further intervention  Order for follow-up pelvic ultrasound given  Aware to call if increased pain or any other gynecological concerns  Stiven De La Rosa is a 20 y o  female here for a Follow-up visit  For right lower quadrant pain with finding of right hemorrhagic cyst at time of CT scan  Today patient denies any pain  States that her right lower quadrant pain seems to have completely resolved about 3 days ago  Denies fevers chills nausea vomiting    Patient Active Problem List   Diagnosis    Acute otitis media, right    Nonspecific mesenteric adenitis       Gynecologic History  Patient's last menstrual period was 03/20/2022 (exact date)  The current method of family planning is oral progesterone-only contraceptive      Past Medical History:   Diagnosis Date    Migraine with aura     No pertinent past medical history     Strep throat      Past Surgical History:   Procedure Laterality Date    NO PAST SURGERIES       Family History   Problem Relation Age of Onset    No Known Problems Mother     No Known Problems Father     No Known Problems Sister     Breast cancer Maternal Grandmother     Arthritis Family      Social History     Socioeconomic History    Marital status: Single     Spouse name: Not on file    Number of children: Not on file    Years of education: Not on file    Highest education level: Not on file   Occupational History    Not on file   Tobacco Use    Smoking status: Former Smoker    Smokeless tobacco: Never Used    Tobacco comment: patient no longer vapes   Substance and Sexual Activity    Alcohol use: No    Drug use: Yes     Types: Marijuana    Sexual activity: Yes     Birth control/protection: None   Other Topics Concern    Not on file   Social History Narrative    Not on file     Social Determinants of Health     Financial Resource Strain: Not on file   Food Insecurity: Not on file   Transportation Needs: Not on file   Physical Activity: Not on file   Stress: Not on file   Social Connections: Not on file   Intimate Partner Violence: Not on file   Housing Stability: Not on file     No Known Allergies    Current Outpatient Medications:     ibuprofen (MOTRIN) 600 mg tablet, Take 1 tablet (600 mg total) by mouth every 6 (six) hours as needed for mild pain or moderate pain, Disp: 30 tablet, Rfl: 1    norethindrone (MICRONOR) 0 35 MG tablet, Take 1 tablet (0 35 mg total) by mouth daily, Disp: 84 tablet, Rfl: 3    ondansetron (ZOFRAN-ODT) 4 mg disintegrating tablet, Take 4 mg by mouth every 8 (eight) hours as needed (Patient not taking: Reported on 4/8/2022 ), Disp: , Rfl:     Review of Systems  Constitutional :no fever, feels well, no tiredness, no recent weight gain or loss  ENT: no ear ache, no loss of hearing, no nosebleeds or nasal discharge, no sore throat or hoarseness  Cardiovascular: no complaints of slow or fast heart beat, no chest pain, no palpitations, no leg claudication or lower extremity edema  Respiratory: no complaints of shortness of shortness of breath, no BALDERRAMA  Breasts:no complaints of breast pain, breast lump, or nipple discharge  Gastrointestinal: no complaints of abdominal pain, constipation, nausea, vomiting, or diarrhea or bloody stools  Genitourinary : no complaints of dysuria, incontinence, pelvic pain, no dysmenorrhea, vaginal discharge or abnormal vaginal bleeding and as noted in HPI  Musculoskeletal: no complaints of arthralgia, no myalgia, no joint swelling or stiffness, no limb pain or swelling    Integumentary: no complaints of skin rash or lesion, itching or dry skin  Neurological: no complaints of headache, no confusion, no numbness or tingling, no dizziness or fainting     Objective     /68   Ht 5' 5" (1 651 m)   Wt 87 1 kg (192 lb)   LMP 03/20/2022 (Exact Date)   BMI 31 95 kg/m²     General:   appears stated age, cooperative, alert normal mood and affect   Lungs: Unlabored breathing    Abdomen: soft, non-tender, without masses or organomegaly   pelvic Deferred    Skin normal skin turgor and no rashes     Psychiatric orientation to person, place, and time: normal  mood and affect: normal

## 2022-04-12 ENCOUNTER — HOSPITAL ENCOUNTER (OUTPATIENT)
Dept: MAMMOGRAPHY | Facility: CLINIC | Age: 22
Discharge: HOME | End: 2022-04-12
Payer: COMMERCIAL

## 2022-04-12 DIAGNOSIS — N63.0 BREAST LUMP: ICD-10-CM

## 2022-04-12 PROCEDURE — 76642 ULTRASOUND BREAST LIMITED: CPT

## 2022-05-08 ENCOUNTER — HOSPITAL ENCOUNTER (OUTPATIENT)
Dept: ULTRASOUND IMAGING | Facility: HOSPITAL | Age: 22
Discharge: HOME | End: 2022-05-08
Payer: COMMERCIAL

## 2022-05-08 DIAGNOSIS — N83.201 RIGHT OVARIAN CYST: ICD-10-CM

## 2022-05-08 PROCEDURE — 76856 US EXAM PELVIC COMPLETE: CPT

## 2022-05-08 PROCEDURE — 76830 TRANSVAGINAL US NON-OB: CPT

## 2022-09-24 ENCOUNTER — APPOINTMENT (OUTPATIENT)
Dept: LAB | Facility: CLINIC | Age: 22
End: 2022-09-24
Payer: COMMERCIAL

## 2022-09-24 DIAGNOSIS — L70.0 ACNE VULGARIS: ICD-10-CM

## 2022-09-24 LAB
ANION GAP SERPL CALCULATED.3IONS-SCNC: 10 MMOL/L (ref 4–13)
BUN SERPL-MCNC: 10 MG/DL (ref 5–25)
CALCIUM SERPL-MCNC: 8.9 MG/DL (ref 8.3–10.1)
CHLORIDE SERPL-SCNC: 110 MMOL/L (ref 96–108)
CO2 SERPL-SCNC: 21 MMOL/L (ref 21–32)
CREAT SERPL-MCNC: 0.66 MG/DL (ref 0.6–1.3)
GFR SERPL CREATININE-BSD FRML MDRD: 124 ML/MIN/1.73SQ M
GLUCOSE P FAST SERPL-MCNC: 95 MG/DL (ref 65–99)
POTASSIUM SERPL-SCNC: 4.1 MMOL/L (ref 3.5–5.3)
SODIUM SERPL-SCNC: 141 MMOL/L (ref 135–147)

## 2022-09-24 PROCEDURE — 80048 BASIC METABOLIC PNL TOTAL CA: CPT

## 2022-09-24 PROCEDURE — 36415 COLL VENOUS BLD VENIPUNCTURE: CPT

## 2022-09-30 LAB
ALBUMIN SERPL-MCNC: 4.3 G/DL (ref 3.6–5.1)
ALBUMIN/GLOB SERPL: 1.7 (CALC) (ref 1–2.5)
ALP SERPL-CCNC: 52 U/L (ref 31–125)
ALT SERPL-CCNC: 10 U/L (ref 6–29)
AST SERPL-CCNC: 13 U/L (ref 10–30)
BASOPHILS # BLD AUTO: 61 CELLS/UL (ref 0–200)
BASOPHILS NFR BLD AUTO: 0.8 %
BILIRUB SERPL-MCNC: 0.7 MG/DL (ref 0.2–1.2)
BUN SERPL-MCNC: 9 MG/DL (ref 7–25)
BUN/CREAT SERPL: NORMAL (CALC) (ref 6–22)
C TRACH RRNA SPEC QL NAA+PROBE: NOT DETECTED
CALCIUM SERPL-MCNC: 9.1 MG/DL (ref 8.6–10.2)
CHLORIDE SERPL-SCNC: 108 MMOL/L (ref 98–110)
CHOLEST SERPL-MCNC: 122 MG/DL
CHOLEST/HDLC SERPL: 3.3 (CALC)
CO2 SERPL-SCNC: 25 MMOL/L (ref 20–32)
CREAT SERPL-MCNC: 0.5 MG/DL (ref 0.5–0.96)
EOSINOPHIL # BLD AUTO: 114 CELLS/UL (ref 15–500)
EOSINOPHIL NFR BLD AUTO: 1.5 %
ERYTHROCYTE [DISTWIDTH] IN BLOOD BY AUTOMATED COUNT: 11.8 % (ref 11–15)
FERRITIN SERPL-MCNC: 36 NG/ML (ref 16–154)
GFR/BSA.PRED SERPLBLD CYS-BASED-ARV: 135 ML/MIN/1.73M2
GLOBULIN SER CALC-MCNC: 2.5 G/DL (CALC) (ref 1.9–3.7)
GLUCOSE SERPL-MCNC: 90 MG/DL (ref 65–99)
HCT VFR BLD AUTO: 41.6 % (ref 35–45)
HCV AB S/CO SERPL IA: 0.04
HCV AB SERPL QL IA: NORMAL
HDLC SERPL-MCNC: 37 MG/DL
HGB BLD-MCNC: 14.1 G/DL (ref 11.7–15.5)
HIV 1+2 AB+HIV1 P24 AG SERPL QL IA: NORMAL
HSV1 DNA SPEC QL NAA+PROBE: NOT DETECTED
HSV2 DNA SPEC QL NAA+PROBE: NOT DETECTED
IRON SATN MFR SERPL: 33 % (CALC) (ref 16–45)
IRON SERPL-MCNC: 101 MCG/DL (ref 40–190)
LDLC SERPL CALC-MCNC: 70 MG/DL (CALC)
LYMPHOCYTES # BLD AUTO: 2478 CELLS/UL (ref 850–3900)
LYMPHOCYTES NFR BLD AUTO: 32.6 %
MCH RBC QN AUTO: 31 PG (ref 27–33)
MCHC RBC AUTO-ENTMCNC: 33.9 G/DL (ref 32–36)
MCV RBC AUTO: 91.4 FL (ref 80–100)
MONOCYTES # BLD AUTO: 494 CELLS/UL (ref 200–950)
MONOCYTES NFR BLD AUTO: 6.5 %
N GONORRHOEA RRNA SPEC QL NAA+PROBE: NOT DETECTED
NEUTROPHILS # BLD AUTO: 4454 CELLS/UL (ref 1500–7800)
NEUTROPHILS NFR BLD AUTO: 58.6 %
NONHDLC SERPL-MCNC: 85 MG/DL (CALC)
PLATELET # BLD AUTO: 350 THOUSAND/UL (ref 140–400)
PMV BLD REES-ECKER: 9.2 FL (ref 7.5–12.5)
POTASSIUM SERPL-SCNC: 4.4 MMOL/L (ref 3.5–5.3)
PROT SERPL-MCNC: 6.8 G/DL (ref 6.1–8.1)
RBC # BLD AUTO: 4.55 MILLION/UL (ref 3.8–5.1)
RPR SER QL: NORMAL
SODIUM SERPL-SCNC: 139 MMOL/L (ref 135–146)
SPECIMEN SOURCE: NORMAL
TIBC SERPL-MCNC: 309 MCG/DL (CALC) (ref 250–450)
TRIGL SERPL-MCNC: 69 MG/DL
TSH SERPL-ACNC: 0.73 MIU/L
WBC # BLD AUTO: 7.6 THOUSAND/UL (ref 3.8–10.8)

## 2023-03-21 DIAGNOSIS — N94.6 DYSMENORRHEA: ICD-10-CM

## 2023-03-22 ENCOUNTER — PATIENT MESSAGE (OUTPATIENT)
Dept: FAMILY MEDICINE CLINIC | Facility: CLINIC | Age: 23
End: 2023-03-22

## 2023-03-22 DIAGNOSIS — N94.6 DYSMENORRHEA: ICD-10-CM

## 2023-03-22 RX ORDER — ACETAMINOPHEN AND CODEINE PHOSPHATE 120; 12 MG/5ML; MG/5ML
1 SOLUTION ORAL DAILY
Qty: 84 TABLET | Refills: 0 | Status: SHIPPED | OUTPATIENT
Start: 2023-03-22

## 2023-03-22 RX ORDER — ACETAMINOPHEN AND CODEINE PHOSPHATE 120; 12 MG/5ML; MG/5ML
1 SOLUTION ORAL DAILY
Qty: 84 TABLET | Refills: 0 | OUTPATIENT
Start: 2023-03-22

## 2023-03-22 NOTE — TELEPHONE ENCOUNTER
----- Message from Jet Millan sent at 3/22/2023  8:17 AM EDT -----  Regarding: Norethindrone new prescription   Contact: [phone]  Hello I had a prescription number  and I was wondering if I could get a new one   I requested a refill but I don’t think it would process with an  prescription number

## 2023-06-10 DIAGNOSIS — N94.6 DYSMENORRHEA: ICD-10-CM

## 2023-06-11 RX ORDER — ACETAMINOPHEN AND CODEINE PHOSPHATE 120; 12 MG/5ML; MG/5ML
SOLUTION ORAL
Qty: 84 TABLET | Refills: 0 | Status: SHIPPED | OUTPATIENT
Start: 2023-06-11

## 2023-08-01 DIAGNOSIS — N94.6 DYSMENORRHEA: ICD-10-CM

## 2023-08-01 RX ORDER — ACETAMINOPHEN AND CODEINE PHOSPHATE 120; 12 MG/5ML; MG/5ML
SOLUTION ORAL
Qty: 84 TABLET | Refills: 1 | Status: SHIPPED | OUTPATIENT
Start: 2023-08-01

## 2024-01-10 ENCOUNTER — OFFICE VISIT (OUTPATIENT)
Dept: FAMILY MEDICINE CLINIC | Facility: CLINIC | Age: 24
End: 2024-01-10
Payer: COMMERCIAL

## 2024-01-10 VITALS
DIASTOLIC BLOOD PRESSURE: 64 MMHG | HEIGHT: 65 IN | WEIGHT: 203.8 LBS | SYSTOLIC BLOOD PRESSURE: 88 MMHG | OXYGEN SATURATION: 97 % | BODY MASS INDEX: 33.95 KG/M2 | TEMPERATURE: 97.8 F | HEART RATE: 69 BPM

## 2024-01-10 DIAGNOSIS — Z13.1 SCREENING FOR DIABETES MELLITUS: ICD-10-CM

## 2024-01-10 DIAGNOSIS — Z13.29 SCREENING FOR THYROID DISORDER: ICD-10-CM

## 2024-01-10 DIAGNOSIS — Z13.0 SCREENING FOR DEFICIENCY ANEMIA: ICD-10-CM

## 2024-01-10 DIAGNOSIS — R00.2 PALPITATIONS: Primary | ICD-10-CM

## 2024-01-10 PROCEDURE — 99214 OFFICE O/P EST MOD 30 MIN: CPT | Performed by: NURSE PRACTITIONER

## 2024-01-10 NOTE — PROGRESS NOTES
"[facility]    ASSESSMENT AND PLAN     1. Palpitations  Assessment & Plan:  Differentials reviewed today at length  EKG: Normal sinus-normal ECG  Workup today with lab work: CBC, CMP, TSH and iron panel  Check 48-hour Holter  Keep specific diary/journal of symptoms and activities during  Discussed likely referral to cardiology for further testing possible ZIO if Holter abnormal  Patient agreeable with plan  Return to care sooner with any acute change or further concerns    Orders:  -     CBC and differential; Future  -     Comprehensive metabolic panel; Future  -     TSH, 3rd generation with Free T4 reflex; Future  -     Holter monitor; Future; Expected date: 01/10/2024  -     Iron Panel (Includes Ferritin, Iron Sat%, Iron, and TIBC); Future  -     POCT ECG    2. Screening for diabetes mellitus  -     Comprehensive metabolic panel; Future    3. Screening for thyroid disorder  -     TSH, 3rd generation with Free T4 reflex; Future    4. Screening for deficiency anemia  -     CBC and differential; Future  -     Iron Panel (Includes Ferritin, Iron Sat%, Iron, and TIBC); Future           SUBJECTIVE       Patient ID: Mima Fabian is a 24 y.o. female.    Chief Complaint   Patient presents with    Palpitations       HISTORY OF PRESENT ILLNESS    Acute visit  Presents today with c/o palpitations. Has noted about 1-1.5 years but been worsening over the last few months. Occurs randomly. Both with activity and at rest. Feels like her heart has a \"second heart beat\" when they happen. She feels a \"flutter\" sensation. And feels like her heart is \"pounding\". During some of  these episodes, she notes some dizziness and feels SOB. That does not occur with each episode. Denies any associated GI issues. Does report - although very infrequent -  a tunnel like vision when the dizziness and SOB are happening. Feels like she stays well hydrated and that her diet is well balanced. She is taking super foods powders few times per " "week. Does exercise routinely. Oddly, she reports that her symptoms do not usually occur during her exercise times. She cannot associate it with stressful times. Cannot associate with her menses. Denies any chest pain or pressure. No leg pain or swelling.           The following portions of the patient's history were reviewed and updated as appropriate: allergies, current medications, past family history, past medical history, past social history, past surgical history, and problem list.    REVIEW OF SYSTEMS  Review of Systems   Constitutional:  Negative for activity change, appetite change, fatigue, fever and unexpected weight change.   HENT: Negative.     Respiratory:  Positive for shortness of breath. Negative for cough, chest tightness and wheezing.    Cardiovascular:  Positive for palpitations. Negative for chest pain and leg swelling.   Gastrointestinal: Negative.    Genitourinary: Negative.    Neurological:  Positive for dizziness. Negative for weakness, numbness and headaches.   Psychiatric/Behavioral: Negative.         OBJECTIVE      VITAL SIGNS  BP (!) 88/64 (BP Location: Left arm, Patient Position: Sitting, Cuff Size: Adult)   Pulse 69   Temp 97.8 °F (36.6 °C)   Ht 5' 5\" (1.651 m)   Wt 92.4 kg (203 lb 12.8 oz)   LMP 01/08/2024 (Approximate)   SpO2 97%   BMI 33.91 kg/m²     CURRENT MEDICATIONS    Current Outpatient Medications:     ibuprofen (MOTRIN) 600 mg tablet, Take 1 tablet (600 mg total) by mouth every 6 (six) hours as needed for mild pain or moderate pain, Disp: 30 tablet, Rfl: 1    norethindrone (MICRONOR) 0.35 MG tablet, TAKE 1 TABLET BY MOUTH EVERY DAY, Disp: 84 tablet, Rfl: 1      PHYSICAL EXAMINATION   Physical Exam  Vitals and nursing note reviewed.   Constitutional:       General: She is not in acute distress.     Appearance: Normal appearance. She is well-developed and well-groomed. She is not ill-appearing.   HENT:      Head: Normocephalic.   Neck:      Thyroid: No thyromegaly. "   Cardiovascular:      Rate and Rhythm: Normal rate and regular rhythm.      Heart sounds: Normal heart sounds.      Comments: Suspect a few PVCs  during auscultation  Nothing noted on EKG  Pulmonary:      Effort: Pulmonary effort is normal. No respiratory distress.      Breath sounds: Normal breath sounds and air entry.   Skin:     General: Skin is warm and dry.   Neurological:      General: No focal deficit present.      Mental Status: She is alert and oriented to person, place, and time.   Psychiatric:         Attention and Perception: Attention normal.         Mood and Affect: Mood normal.         Behavior: Behavior normal.         Thought Content: Thought content normal.         Judgment: Judgment normal.

## 2024-01-10 NOTE — ASSESSMENT & PLAN NOTE
Differentials reviewed today at length  EKG: Normal sinus-normal ECG  Workup today with lab work: CBC, CMP, TSH and iron panel  Check 48-hour Holter  Keep specific diary/journal of symptoms and activities during  Discussed likely referral to cardiology for further testing possible ZIO if Holter abnormal  Patient agreeable with plan  Return to care sooner with any acute change or further concerns

## 2024-01-11 ENCOUNTER — TELEPHONE (OUTPATIENT)
Dept: FAMILY MEDICINE CLINIC | Facility: CLINIC | Age: 24
End: 2024-01-11

## 2024-01-12 PROCEDURE — 93000 ELECTROCARDIOGRAM COMPLETE: CPT | Performed by: NURSE PRACTITIONER

## 2024-01-20 DIAGNOSIS — N94.6 DYSMENORRHEA: ICD-10-CM

## 2024-01-22 RX ORDER — ACETAMINOPHEN AND CODEINE PHOSPHATE 120; 12 MG/5ML; MG/5ML
1 SOLUTION ORAL DAILY
Qty: 84 TABLET | Refills: 0 | Status: SHIPPED | OUTPATIENT
Start: 2024-01-22

## 2024-01-22 RX ORDER — ACETAMINOPHEN AND CODEINE PHOSPHATE 120; 12 MG/5ML; MG/5ML
SOLUTION ORAL
Qty: 84 TABLET | Refills: 1 | Status: SHIPPED | OUTPATIENT
Start: 2024-01-22

## 2024-01-30 ENCOUNTER — HOSPITAL ENCOUNTER (OUTPATIENT)
Dept: NON INVASIVE DIAGNOSTICS | Facility: HOSPITAL | Age: 24
Discharge: HOME | End: 2024-01-30
Payer: COMMERCIAL

## 2024-01-30 DIAGNOSIS — R00.2 PALPITATIONS: ICD-10-CM

## 2024-01-30 PROCEDURE — 93225 XTRNL ECG REC<48 HRS REC: CPT

## 2024-01-30 PROCEDURE — 93226 XTRNL ECG REC<48 HR SCAN A/R: CPT

## 2024-02-01 ENCOUNTER — APPOINTMENT (OUTPATIENT)
Dept: LAB | Facility: CLINIC | Age: 24
End: 2024-02-01
Payer: COMMERCIAL

## 2024-02-06 PROCEDURE — 93227 XTRNL ECG REC<48 HR R&I: CPT

## 2024-02-08 ENCOUNTER — TELEPHONE (OUTPATIENT)
Dept: FAMILY MEDICINE CLINIC | Facility: CLINIC | Age: 24
End: 2024-02-08

## 2024-02-08 NOTE — TELEPHONE ENCOUNTER
----- Message from YAZMIN Rodriguez sent at 2/7/2024 10:08 PM EST -----  Pls call pt. Schedule annual physical  Will review/discuss labs and testing at [facility]

## 2024-02-21 ENCOUNTER — OFFICE VISIT (OUTPATIENT)
Dept: FAMILY MEDICINE CLINIC | Facility: CLINIC | Age: 24
End: 2024-02-21
Payer: COMMERCIAL

## 2024-02-21 VITALS
OXYGEN SATURATION: 99 % | DIASTOLIC BLOOD PRESSURE: 62 MMHG | BODY MASS INDEX: 33.62 KG/M2 | HEIGHT: 65 IN | SYSTOLIC BLOOD PRESSURE: 100 MMHG | WEIGHT: 201.8 LBS | TEMPERATURE: 97.6 F | HEART RATE: 74 BPM

## 2024-02-21 DIAGNOSIS — Z00.00 ANNUAL PHYSICAL EXAM: Primary | ICD-10-CM

## 2024-02-21 DIAGNOSIS — M54.2 CHRONIC NECK PAIN: ICD-10-CM

## 2024-02-21 DIAGNOSIS — R00.2 PALPITATIONS: ICD-10-CM

## 2024-02-21 DIAGNOSIS — G89.29 CHRONIC NECK PAIN: ICD-10-CM

## 2024-02-21 PROBLEM — H66.91 ACUTE OTITIS MEDIA, RIGHT: Status: RESOLVED | Noted: 2018-04-07 | Resolved: 2024-02-21

## 2024-02-21 PROCEDURE — 99395 PREV VISIT EST AGE 18-39: CPT | Performed by: NURSE PRACTITIONER

## 2024-02-21 RX ORDER — METHOCARBAMOL 500 MG/1
500 TABLET, FILM COATED ORAL 3 TIMES DAILY PRN
Qty: 30 TABLET | Refills: 0 | Status: SHIPPED | OUTPATIENT
Start: 2024-02-21

## 2024-02-21 NOTE — ASSESSMENT & PLAN NOTE
Persist for about 2 months now  Appears muscular  Discussed sleeping positions/pillows  Home stretches ineffective  Will refer to PT today  Small dose Robaxin for HS as needed

## 2024-02-21 NOTE — ASSESSMENT & PLAN NOTE
Reviewed Holter and lab work  Reports minimal caffeine; sleep well; denies GERD; average stress/anxiety  Unable to identify cause of her complaints today  With persistent symptoms - will refer to cardiology

## 2024-02-21 NOTE — PROGRESS NOTES
ADULT ANNUAL PHYSICAL  [facility]    NAME: Mima Fabian  AGE: 24 y.o. SEX: female  : 2000     DATE: 2024     Assessment and Plan:   Comprehensive physical exam  PMH and chronic conditions reviewed  Medications reconciled  Preventative care and recommended screenings as below  Continue annual physicals  Follow-up sooner as needed  Problem List Items Addressed This Visit          Other    Palpitations     Reviewed Holter and lab work  Reports minimal caffeine; sleep well; denies GERD; average stress/anxiety  Unable to identify cause of her complaints today  With persistent symptoms - will refer to cardiology          Relevant Orders    Ambulatory Referral to Cardiology    Chronic neck pain     Persist for about 2 months now  Appears muscular  Discussed sleeping positions/pillows  Home stretches ineffective  Will refer to PT today  Small dose Robaxin for HS as needed         Relevant Medications    methocarbamol (ROBAXIN) 500 mg tablet    Other Relevant Orders    Ambulatory Referral to Physical Therapy     Other Visit Diagnoses       Annual physical exam    -  Primary            Immunizations and preventive care screenings were discussed with patient today. Appropriate education was printed on patient's after visit summary.    Counseling:  Alcohol/drug use: discussed moderation in alcohol intake, the recommendations for healthy alcohol use, and avoidance of illicit drug use.  Dental Health: discussed importance of regular tooth brushing, flossing, and dental visits.  Injury prevention: discussed safety/seat belts, safety helmets, smoke detectors, carbon dioxide detectors, and smoking near bedding or upholstery.  Sexual health: discussed sexually transmitted diseases, partner selection, use of condoms, avoidance of unintended pregnancy, and contraceptive alternatives.  Exercise: the importance of regular exercise/physical activity was discussed.  Recommend exercise 3-5 times per week for at least 30 minutes.          Return in about 1 year (around 2/21/2025) for Annual physical.     Chief Complaint:     Chief Complaint   Patient presents with    Physical Exam      History of Present Illness:     Adult Annual Physical   Patient here for a comprehensive physical exam. The patient reports problems - cervical neck pain .    Diet and Physical Activity  Diet/Nutrition: well balanced diet.   Exercise: walking and moderate cardiovascular exercise.      Depression Screening  PHQ-2/9 Depression Screening           General Health  Sleep: sleeps well and gets 7-8 hours of sleep on average.   Hearing: normal - bilateral.  Vision: no vision problems and no recent eye exam; Snellen today 20/15 OU; denies any eye/vision complaints .   Dental: regular dental visits.     Immunizations: Tdap 2022; Gardisil series 2012    /GYN Health  Follows with gynecology? yes   PAP 2022  Last menstrual period: 2/4  Contraceptive method: oral contraceptives.  History of STDs?: no.   Advise monthly self breast exams  Advise daily vitamin supplement/immune support    Advanced Care Planning  Do you have an advanced directive? no  Do you have a durable medical power of ? no  ACP document given to the patient? no      Review of Systems:     Review of Systems   Constitutional: Negative.    HENT: Negative.     Eyes: Negative.    Respiratory: Negative.     Cardiovascular:  Positive for palpitations. Negative for chest pain.   Gastrointestinal: Negative.    Genitourinary: Negative.    Musculoskeletal:  Positive for neck pain.   Skin: Negative.    Neurological: Negative.    Psychiatric/Behavioral: Negative.        Past Medical History:     Past Medical History:   Diagnosis Date    Migraine with aura     No pertinent past medical history     Strep throat       Past Surgical History:     Past Surgical History:   Procedure Laterality Date    NO PAST SURGERIES        Social History:     Social  "History     Socioeconomic History    Marital status: Single     Spouse name: None    Number of children: None    Years of education: None    Highest education level: None   Occupational History    None   Tobacco Use    Smoking status: Former    Smokeless tobacco: Never    Tobacco comments:     patient no longer vapes   Vaping Use    Vaping status: Never Used   Substance and Sexual Activity    Alcohol use: No    Drug use: Yes     Types: Marijuana    Sexual activity: Yes     Birth control/protection: None   Other Topics Concern    None   Social History Narrative    None     Social Determinants of Health     Financial Resource Strain: Not on file   Food Insecurity: Not on file   Transportation Needs: Not on file   Physical Activity: Not on file   Stress: Not on file   Social Connections: Not on file   Intimate Partner Violence: Not on file   Housing Stability: Not on file      Family History:     Family History   Problem Relation Age of Onset    No Known Problems Mother     No Known Problems Father     No Known Problems Sister     Breast cancer Maternal Grandmother 47    Arthritis Family       Current Medications:     Current Outpatient Medications   Medication Sig Dispense Refill    ibuprofen (MOTRIN) 600 mg tablet Take 1 tablet (600 mg total) by mouth every 6 (six) hours as needed for mild pain or moderate pain 30 tablet 1    methocarbamol (ROBAXIN) 500 mg tablet Take 1 tablet (500 mg total) by mouth 3 (three) times a day as needed for muscle spasms 30 tablet 0    norethindrone (MICRONOR) 0.35 MG tablet TAKE 1 TABLET BY MOUTH EVERY DAY 84 tablet 1     No current facility-administered medications for this visit.      Allergies:     No Known Allergies   Physical Exam:     /62 (BP Location: Left arm, Patient Position: Sitting, Cuff Size: Adult)   Pulse 74   Temp 97.6 °F (36.4 °C)   Ht 5' 5\" (1.651 m)   Wt 91.5 kg (201 lb 12.8 oz)   LMP 02/04/2024   SpO2 99%   BMI 33.58 kg/m²     Physical Exam  Vitals and " nursing note reviewed.   Constitutional:       General: She is not in acute distress.     Appearance: Normal appearance. She is well-developed and well-groomed. She is not ill-appearing.   HENT:      Head: Normocephalic.      Right Ear: Tympanic membrane, ear canal and external ear normal.      Left Ear: Tympanic membrane, ear canal and external ear normal.      Nose: Nose normal.      Mouth/Throat:      Mouth: Mucous membranes are moist.      Pharynx: Oropharynx is clear.   Eyes:      Conjunctiva/sclera: Conjunctivae normal.      Pupils: Pupils are equal, round, and reactive to light.   Neck:      Thyroid: No thyromegaly.      Vascular: No carotid bruit.   Cardiovascular:      Rate and Rhythm: Normal rate and regular rhythm.      Pulses:           Posterior tibial pulses are 2+ on the right side and 2+ on the left side.      Heart sounds: Normal heart sounds.   Pulmonary:      Effort: Pulmonary effort is normal. No respiratory distress.      Breath sounds: Normal breath sounds and air entry.   Abdominal:      General: Bowel sounds are normal.      Palpations: Abdomen is soft.      Tenderness: There is no abdominal tenderness.   Musculoskeletal:      Right lower leg: No edema.      Left lower leg: No edema.   Lymphadenopathy:      Cervical: No cervical adenopathy.   Skin:     General: Skin is warm and dry.   Neurological:      General: No focal deficit present.      Mental Status: She is alert and oriented to person, place, and time.   Psychiatric:         Attention and Perception: Attention normal.         Mood and Affect: Mood normal.         Behavior: Behavior normal.         Thought Content: Thought content normal.         Judgment: Judgment normal.          YAZMIN Rodriguez   [facility]

## 2024-04-16 DIAGNOSIS — N94.6 DYSMENORRHEA: ICD-10-CM

## 2024-04-17 RX ORDER — ACETAMINOPHEN AND CODEINE PHOSPHATE 120; 12 MG/5ML; MG/5ML
1 SOLUTION ORAL DAILY
Qty: 84 TABLET | Refills: 0 | Status: SHIPPED | OUTPATIENT
Start: 2024-04-17

## 2024-07-10 DIAGNOSIS — N94.6 DYSMENORRHEA: ICD-10-CM

## 2024-07-11 ENCOUNTER — TELEPHONE (OUTPATIENT)
Age: 24
End: 2024-07-11

## 2024-07-15 ENCOUNTER — TELEPHONE (OUTPATIENT)
Age: 24
End: 2024-07-15

## 2024-07-15 DIAGNOSIS — G89.29 CHRONIC NECK PAIN: ICD-10-CM

## 2024-07-15 DIAGNOSIS — M54.2 CHRONIC NECK PAIN: ICD-10-CM

## 2024-07-15 DIAGNOSIS — N94.6 DYSMENORRHEA: ICD-10-CM

## 2024-07-15 RX ORDER — ACETAMINOPHEN AND CODEINE PHOSPHATE 120; 12 MG/5ML; MG/5ML
1 SOLUTION ORAL DAILY
Qty: 84 TABLET | Refills: 0 | Status: SHIPPED | OUTPATIENT
Start: 2024-07-15

## 2024-07-15 NOTE — TELEPHONE ENCOUNTER
Patient called refill line regarding norethindrone (MICRONOR) 0.35 MG tablet stating she received a message stating it was cancelled. Advised last office visit was 4/2022.

## 2024-07-17 RX ORDER — ACETAMINOPHEN AND CODEINE PHOSPHATE 120; 12 MG/5ML; MG/5ML
1 SOLUTION ORAL DAILY
Qty: 84 TABLET | Refills: 0 | Status: SHIPPED | OUTPATIENT
Start: 2024-07-17

## 2024-10-02 ENCOUNTER — OFFICE VISIT (OUTPATIENT)
Dept: OBGYN CLINIC | Facility: CLINIC | Age: 24
End: 2024-10-02
Payer: COMMERCIAL

## 2024-10-02 VITALS
SYSTOLIC BLOOD PRESSURE: 122 MMHG | BODY MASS INDEX: 33.66 KG/M2 | DIASTOLIC BLOOD PRESSURE: 70 MMHG | HEIGHT: 65 IN | WEIGHT: 202 LBS

## 2024-10-02 DIAGNOSIS — N94.6 DYSMENORRHEA: ICD-10-CM

## 2024-10-02 DIAGNOSIS — Z01.419 ENCOUNTER FOR GYNECOLOGICAL EXAMINATION: Primary | ICD-10-CM

## 2024-10-02 DIAGNOSIS — N64.4 BREAST PAIN, RIGHT: ICD-10-CM

## 2024-10-02 PROCEDURE — 99395 PREV VISIT EST AGE 18-39: CPT | Performed by: OBSTETRICS & GYNECOLOGY

## 2024-10-02 RX ORDER — ACETAMINOPHEN AND CODEINE PHOSPHATE 120; 12 MG/5ML; MG/5ML
1 SOLUTION ORAL DAILY
Qty: 84 TABLET | Refills: 3 | Status: SHIPPED | OUTPATIENT
Start: 2024-10-02

## 2024-10-02 NOTE — PROGRESS NOTES
ASSESSMENT & PLAN: Mima Fabian is a 24 y.o.  with normal gynecologic exam.    1.  Routine well woman exam done today  2.  Pap:  The patient's last pap was .    It was normal.    Pap was not done today.    Current ASCCP Guidelines reviewed.   3.  STD testing  was not done   4.  Gardasil recommendations reviewed is vaccinated.  5. The following were reviewed in today's visit: breast self exam, use and side effects of OCPs, family planning choices, exercise, and healthy diet  6. Right breast pain - no palpable masses , diagnostic US ordered      CC:  Annual Gynecologic Examination    HPI: Mima Fabian is a 24 y.o.  who presents for annual gynecologic examination.    She has the following concerns:  right breast pain  at random times     Health Maintenance:    She wears her seatbelt routinely.    She does perform regular monthly self breast exams.    She feels safe at home.     Past Medical History:   Diagnosis Date    Migraine with aura     No pertinent past medical history     Strep throat        Past Surgical History:   Procedure Laterality Date    NO PAST SURGERIES         OB/Gyn History:      OB History          1    Para   0    Term   0       0    AB   1    Living   0         SAB   0    IAB   0    Ectopic   0    Multiple   0    Live Births   0                 Family History   Problem Relation Age of Onset    No Known Problems Mother     No Known Problems Father     No Known Problems Sister     Breast cancer Maternal Grandmother 47    Arthritis Family        Social History:  Social History     Socioeconomic History    Marital status: Single     Spouse name: Not on file    Number of children: Not on file    Years of education: Not on file    Highest education level: Not on file   Occupational History    Not on file   Tobacco Use    Smoking status: Former    Smokeless tobacco: Never    Tobacco comments:     patient no longer vapes   Vaping Use    Vaping status: Never Used   Substance  and Sexual Activity    Alcohol use: No    Drug use: Yes     Types: Marijuana    Sexual activity: Yes     Partners: Male     Birth control/protection: OCP   Other Topics Concern    Not on file   Social History Narrative    Not on file     Social Determinants of Health     Financial Resource Strain: Not on file   Food Insecurity: Not on file   Transportation Needs: Not on file   Physical Activity: Not on file   Stress: Not on file   Social Connections: Not on file   Intimate Partner Violence: Not on file   Housing Stability: Not on file       No Known Allergies      Current Outpatient Medications:     ibuprofen (MOTRIN) 600 mg tablet, Take 1 tablet (600 mg total) by mouth every 6 (six) hours as needed for mild pain or moderate pain, Disp: 30 tablet, Rfl: 1    norethindrone (MICRONOR) 0.35 MG tablet, Take 1 tablet (0.35 mg total) by mouth daily, Disp: 84 tablet, Rfl: 0    norethindrone (MICRONOR) 0.35 MG tablet, Take 1 tablet (0.35 mg total) by mouth daily, Disp: 84 tablet, Rfl: 3    methocarbamol (ROBAXIN) 500 mg tablet, Take 1 tablet (500 mg total) by mouth 3 (three) times a day as needed for muscle spasms (Patient not taking: Reported on 10/2/2024), Disp: 30 tablet, Rfl: 0    Review of Systems:  Constitutional :no fever, feels well, no tiredness, no recent weight gain or loss  ENT: no ear ache, no loss of hearing, no nosebleeds or nasal discharge, no sore throat or hoarseness.  Cardiovascular: no complaints of slow or fast heart beat, no chest pain, no palpitations, no leg claudication or lower extremity edema.  Respiratory: no complaints of shortness of shortness of breath, no BALDERRAMA  Breasts:as noted in HPI  Gastrointestinal: no complaints of abdominal pain, constipation, nausea, vomiting, or diarrhea or bloody stools  Genitourinary : no complaints of dysuria, incontinence, pelvic pain, no dysmenorrhea, vaginal discharge or abnormal vaginal bleeding and as noted in HPI.  Musculoskeletal: no complaints of arthralgia,  "no myalgia, no joint swelling or stiffness, no limb pain or swelling.  Integumentary: no complaints of skin rash or lesion, itching or dry skin  Neurological: no complaints of headache, no confusion, no numbness or tingling, no dizziness or fainting    Objective      /70   Ht 5' 5\" (1.651 m)   Wt 91.6 kg (202 lb)   LMP 09/12/2024 (Exact Date)   BMI 33.61 kg/m²     General:   appears stated age, cooperative, alert normal mood and affect   Lungs: Unlabored breathing     Breasts: normal appearance, no masses or tenderness, Inspection negative, No nipple retraction or dimpling, No nipple discharge or bleeding, No axillary or supraclavicular adenopathy, Normal to palpation without dominant masses   Abdomen: soft, non-tender, without masses or organomegaly   Vulva: normal, normal female genitalia, Bartholin's, Urethra, [place] normal, no lesions, normal female hair distribution, no clitoral enlargement   Vagina: normal vagina, no discharge, exudate, lesion, or erythema   Urethra: normal   Cervix: Normal, no discharge. Nontender.   Uterus: normal size, contour, position, consistency, mobility, non-tender   Adnexa: no mass, fullness, tenderness   Psychiatric orientation to person, place, and time: normal. mood and affect: normal          "

## 2024-12-13 DIAGNOSIS — N94.6 DYSMENORRHEA: ICD-10-CM

## 2024-12-16 RX ORDER — ACETAMINOPHEN AND CODEINE PHOSPHATE 120; 12 MG/5ML; MG/5ML
1 SOLUTION ORAL DAILY
Qty: 84 TABLET | Refills: 1 | Status: SHIPPED | OUTPATIENT
Start: 2024-12-16

## 2025-02-24 ENCOUNTER — TELEPHONE (OUTPATIENT)
Dept: FAMILY MEDICINE CLINIC | Facility: CLINIC | Age: 25
End: 2025-02-24

## 2025-02-25 ENCOUNTER — RESULTS FOLLOW-UP (OUTPATIENT)
Dept: FAMILY MEDICINE CLINIC | Facility: CLINIC | Age: 25
End: 2025-02-25

## 2025-02-25 ENCOUNTER — OFFICE VISIT (OUTPATIENT)
Dept: FAMILY MEDICINE CLINIC | Facility: CLINIC | Age: 25
End: 2025-02-25
Payer: COMMERCIAL

## 2025-02-25 ENCOUNTER — APPOINTMENT (OUTPATIENT)
Dept: RADIOLOGY | Facility: CLINIC | Age: 25
End: 2025-02-25
Payer: COMMERCIAL

## 2025-02-25 VITALS
DIASTOLIC BLOOD PRESSURE: 54 MMHG | HEART RATE: 68 BPM | HEIGHT: 66 IN | TEMPERATURE: 98.3 F | SYSTOLIC BLOOD PRESSURE: 80 MMHG | OXYGEN SATURATION: 94 % | WEIGHT: 206.8 LBS | BODY MASS INDEX: 33.23 KG/M2

## 2025-02-25 DIAGNOSIS — M79.671 RIGHT FOOT PAIN: ICD-10-CM

## 2025-02-25 DIAGNOSIS — M79.671 RIGHT FOOT PAIN: Primary | ICD-10-CM

## 2025-02-25 PROCEDURE — 73610 X-RAY EXAM OF ANKLE: CPT

## 2025-02-25 PROCEDURE — 73630 X-RAY EXAM OF FOOT: CPT

## 2025-02-25 PROCEDURE — 99213 OFFICE O/P EST LOW 20 MIN: CPT | Performed by: NURSE PRACTITIONER

## 2025-02-25 RX ORDER — IBUPROFEN 600 MG/1
600 TABLET, FILM COATED ORAL EVERY 6 HOURS PRN
Qty: 30 TABLET | Refills: 1 | Status: SHIPPED | OUTPATIENT
Start: 2025-02-25

## 2025-02-25 NOTE — PROGRESS NOTES
Name: Mima Fabian      : 2000      MRN: [id number]  Encounter Provider: YAZMIN Rodriguez  Encounter Date: 2025   Encounter department: [facility]  :  Assessment & Plan  Right foot pain  Unclear cause of current symptoms  Plan today:  Check foot and ankle x-ray-low suspicion, but rule out hairline fracture.  Rx for Motrin for pain/inflammation as needed  Discussed trialing OTC inserts  Referral to podiatry for further evaluation    Orders:    XR foot 3+ vw right; Future    Ambulatory Referral to Podiatry; Future    ibuprofen (MOTRIN) 600 mg tablet; Take 1 tablet (600 mg total) by mouth every 6 (six) hours as needed for mild pain or moderate pain    XR ankle 3+ vw right; Future          Depression Screening and Follow-up Plan: Patient was screened for depression during today's encounter. They screened negative with a PHQ-2 score of 0.        History of Present Illness   Acute visit  Patient presents today with right foot pain.  Started several months ago with dorsal foot pain, primarily between the great and first toe.  Most notable when she would flex the toe or apply pressure with walking.  Most recently-roughly about a month ago, she started with intermittent swelling in that same area.  And now has pain when touching area.  The pain can radiate up the top of the foot and occasionally into her lateral ankle.  She is on her feet all day at work, and does note the pain increases with extended walking.  She did recently get a new pair of work shoes to see if this would make a difference, but she has not been wearing them long enough to determine improvement yet.  She has had no known injury or trauma, but does report multiple sprain injuries as a child.  No other healthcare concerns today      Review of Systems   Musculoskeletal:         Right foot pain as in HPI       Objective   BP (!) 80/54 (BP Location: Left arm, Patient Position: Sitting, Cuff Size: Large)   Pulse 68   " Temp 98.3 °F (36.8 °C)   Ht 5' 6\" (1.676 m)   Wt 93.8 kg (206 lb 12.8 oz)   LMP 02/19/2025   SpO2 94%   BMI 33.38 kg/m²      Physical Exam  Vitals and nursing note reviewed.   Constitutional:       General: She is not in acute distress.     Appearance: Normal appearance. She is well-developed. She is not ill-appearing.   Pulmonary:      Effort: Pulmonary effort is normal. No respiratory distress.   Musculoskeletal:        Feet:    Neurological:      Mental Status: She is alert and oriented to person, place, and time.   Psychiatric:         Attention and Perception: Attention normal.         Mood and Affect: Mood normal.         Behavior: Behavior normal.         Thought Content: Thought content normal.         Judgment: Judgment normal.         "

## 2025-02-26 ENCOUNTER — OFFICE VISIT (OUTPATIENT)
Dept: PODIATRY | Facility: CLINIC | Age: 25
End: 2025-02-26
Payer: COMMERCIAL

## 2025-02-26 VITALS — BODY MASS INDEX: 33.43 KG/M2 | HEIGHT: 66 IN | WEIGHT: 208 LBS | RESPIRATION RATE: 18 BRPM

## 2025-02-26 DIAGNOSIS — M25.571 ARTHRALGIA OF RIGHT FOOT: Primary | ICD-10-CM

## 2025-02-26 DIAGNOSIS — M79.671 RIGHT FOOT PAIN: ICD-10-CM

## 2025-02-26 PROCEDURE — 20600 DRAIN/INJ JOINT/BURSA W/O US: CPT | Performed by: PODIATRIST

## 2025-02-26 PROCEDURE — 99204 OFFICE O/P NEW MOD 45 MIN: CPT | Performed by: PODIATRIST

## 2025-02-26 RX ORDER — LIDOCAINE HYDROCHLORIDE 10 MG/ML
1 INJECTION, SOLUTION INFILTRATION; PERINEURAL
Status: SHIPPED | OUTPATIENT
Start: 2025-02-26

## 2025-02-26 RX ORDER — TRIAMCINOLONE ACETONIDE 40 MG/ML
20 INJECTION, SUSPENSION INTRA-ARTICULAR; INTRAMUSCULAR
Status: SHIPPED | OUTPATIENT
Start: 2025-02-26

## 2025-02-26 RX ADMIN — LIDOCAINE HYDROCHLORIDE 1 ML: 10 INJECTION, SOLUTION INFILTRATION; PERINEURAL at 09:00

## 2025-02-26 RX ADMIN — TRIAMCINOLONE ACETONIDE 20 MG: 40 INJECTION, SUSPENSION INTRA-ARTICULAR; INTRAMUSCULAR at 09:00

## 2025-02-26 NOTE — PROGRESS NOTES
"Name: Mima Fabian      : 2000      MRN: [id number]  Encounter Provider: Jj Calix DPM  Encounter Date: 2025   Encounter department: [facility]    Reviewed x-rays with patient.  Explained that she does have a subchondral cyst but this is not causing her foot pain and has likely been present for quite some time.    At present, pain is present at both the right great toe joint and right second MPJ.  This is likely due to hyperextension or overuse.  Patient has been taking ibuprofen but only started recently.  Cortisone injection advised.  Injected lateral aspect first MPJ right foot and second MPJ right foot with 0.5 cc Kenalog 40 along with 1 cc 1% Xylocaine.  Reappoint 6 weeks.  She may resume all activities.  :  Assessment & Plan  Right foot pain    Orders:    Ambulatory Referral to Podiatry    Arthralgia of right foot             History of Present Illness   HPI  Mima Fabian is a 25 y.o. female who presents with pain on the top of the right great toe joint and also on the top of the second metatarsal phalangeal joint of the right foot.  Patient has been hurting for the past few months.  She does not recall any specific trauma but notes that when she lifts her toes up when walking she has pain.  The patient had x-rays very recently and they revealed a small cyst within the first metatarsal head close to the level of the joint.    I personally viewed x-rays of the right foot dated 2025.  There is a small subchondral cyst noted right first metatarsal head.  No significant evidence of osteoarthritis.          Review of Systems   Musculoskeletal:  Positive for gait problem.   Psychiatric/Behavioral: Negative.                Objective   Resp 18   Ht 5' 6\" (1.676 m)   Wt 94.3 kg (208 lb)   LMP 2025   BMI 33.57 kg/m²      Physical Exam  Constitutional:       Appearance: Normal appearance.   Cardiovascular:      Pulses: Normal pulses.   Musculoskeletal:         General: " Tenderness present.      Comments: Pain with palpation lateral aspect first MPJ right foot and dorsum of second MPJ right foot.  No pain with palpation plantar foot.  Range of motion right first MPJ within normal limits.   Skin:     General: Skin is warm.   Neurological:      General: No focal deficit present.      Mental Status: She is oriented to person, place, and time.           Small joint arthrocentesis: R great MTP  Universal Protocol:  procedure performed by consultantConsent: Verbal consent obtained.  Risks and benefits: risks, benefits and alternatives were discussed  Consent given by: patient  Patient understanding: patient states understanding of the procedure being performed  Patient identity confirmed: verbally with patient  Supporting Documentation  Indications: pain   Procedure Details  Location: great toe - R great MTP  Needle size: 25 G  Ultrasound guidance: no  Approach: dorsal  Medications administered: 1 mL lidocaine 1 %; 20 mg triamcinolone acetonide 40 mg/mL

## 2025-06-04 DIAGNOSIS — N94.6 DYSMENORRHEA: ICD-10-CM

## 2025-06-04 RX ORDER — ACETAMINOPHEN AND CODEINE PHOSPHATE 120; 12 MG/5ML; MG/5ML
1 SOLUTION ORAL DAILY
Qty: 84 TABLET | Refills: 0 | Status: SHIPPED | OUTPATIENT
Start: 2025-06-04

## 2025-06-04 NOTE — TELEPHONE ENCOUNTER
Patient needs an appointment. Please contact the patient to schedule an appointment. Last office visit: 10/2/24

## 2025-06-04 NOTE — TELEPHONE ENCOUNTER
Patient needs an appointment. Please contact the patient to schedule an appointment. Last office visit: 10/2/24.